# Patient Record
Sex: FEMALE | Race: OTHER | HISPANIC OR LATINO
[De-identification: names, ages, dates, MRNs, and addresses within clinical notes are randomized per-mention and may not be internally consistent; named-entity substitution may affect disease eponyms.]

---

## 2017-01-12 ENCOUNTER — APPOINTMENT (OUTPATIENT)
Dept: PULMONOLOGY | Facility: CLINIC | Age: 62
End: 2017-01-12

## 2017-01-12 VITALS
OXYGEN SATURATION: 94 % | DIASTOLIC BLOOD PRESSURE: 68 MMHG | BODY MASS INDEX: 27.27 KG/M2 | HEART RATE: 86 BPM | WEIGHT: 151.5 LBS | SYSTOLIC BLOOD PRESSURE: 110 MMHG

## 2017-01-13 ENCOUNTER — APPOINTMENT (OUTPATIENT)
Dept: PULMONOLOGY | Facility: CLINIC | Age: 62
End: 2017-01-13

## 2017-03-01 ENCOUNTER — EMERGENCY (EMERGENCY)
Facility: HOSPITAL | Age: 62
LOS: 1 days | Discharge: HOME | End: 2017-03-01
Attending: EMERGENCY MEDICINE
Payer: COMMERCIAL

## 2017-03-01 VITALS
DIASTOLIC BLOOD PRESSURE: 87 MMHG | WEIGHT: 149.91 LBS | RESPIRATION RATE: 20 BRPM | OXYGEN SATURATION: 93 % | HEART RATE: 102 BPM | HEIGHT: 61 IN | TEMPERATURE: 98 F | SYSTOLIC BLOOD PRESSURE: 142 MMHG

## 2017-03-01 DIAGNOSIS — J44.9 CHRONIC OBSTRUCTIVE PULMONARY DISEASE, UNSPECIFIED: ICD-10-CM

## 2017-03-01 DIAGNOSIS — I10 ESSENTIAL (PRIMARY) HYPERTENSION: ICD-10-CM

## 2017-03-01 DIAGNOSIS — E11.9 TYPE 2 DIABETES MELLITUS WITHOUT COMPLICATIONS: ICD-10-CM

## 2017-03-01 DIAGNOSIS — F17.210 NICOTINE DEPENDENCE, CIGARETTES, UNCOMPLICATED: ICD-10-CM

## 2017-03-01 DIAGNOSIS — Z88.0 ALLERGY STATUS TO PENICILLIN: ICD-10-CM

## 2017-03-01 DIAGNOSIS — R06.02 SHORTNESS OF BREATH: ICD-10-CM

## 2017-03-01 PROCEDURE — 99284 EMERGENCY DEPT VISIT MOD MDM: CPT

## 2017-03-01 PROCEDURE — 71020: CPT | Mod: 26

## 2017-03-01 RX ORDER — ALBUTEROL 90 UG/1
2.5 AEROSOL, METERED ORAL ONCE
Qty: 0 | Refills: 0 | Status: COMPLETED | OUTPATIENT
Start: 2017-03-01 | End: 2017-03-01

## 2017-03-01 RX ORDER — IPRATROPIUM/ALBUTEROL SULFATE 18-103MCG
3 AEROSOL WITH ADAPTER (GRAM) INHALATION ONCE
Qty: 0 | Refills: 0 | Status: COMPLETED | OUTPATIENT
Start: 2017-03-01 | End: 2017-03-01

## 2017-03-01 RX ADMIN — Medication 3 MILLILITER(S): at 23:36

## 2017-03-01 RX ADMIN — ALBUTEROL 2.5 MILLIGRAM(S): 90 AEROSOL, METERED ORAL at 23:36

## 2017-03-01 NOTE — ED PROVIDER NOTE - OBJECTIVE STATEMENT
60 y/o F pt w/ PMHx of COPD presents to the ED c/o SOB, cough, and subjective fever x2 days. Pt also notes diaphoresis today. Pt states that she has been using her nebulizer to no relief. Pt is not on home O2, but states that oxygen helps her sx. Pt denies chest pain, edema, nasal congestion, or any other complaints. Pt is allergic to Amoxicillin. PMD: Dr. Lopez.

## 2017-03-01 NOTE — ED PROVIDER NOTE - NS ED MD SCRIBE ATTENDING SCRIBE SECTIONS
PHYSICAL EXAM/DISPOSITION/VITAL SIGNS( Pullset)/REVIEW OF SYSTEMS/HIV/HISTORY OF PRESENT ILLNESS/PAST MEDICAL/SURGICAL/SOCIAL HISTORY

## 2017-03-01 NOTE — ED ADULT NURSE NOTE - OBJECTIVE STATEMENT
Pt. presents to ED with c/o SOB for the past two days and HERNANDEZ. Pt. is a current everyday smoker, with 20 plus pack years. Pt. is labored breathing at rest, placed on breathing treatment. Pt. states feeling better but wheezes are still present bilaterally. Line and labs and solu-medrol. continue to monitor

## 2017-03-01 NOTE — ED PROVIDER NOTE - PMH
COPD (chronic obstructive pulmonary disease)    DM (diabetes mellitus)    HTN (hypertension)    IBS (irritable bowel syndrome)

## 2017-03-01 NOTE — ED ADULT NURSE NOTE - DISCHARGE TEACHING
Patient verbalized understanding of discharge instructions, need for followup with PMD. Patient also provided with signs and symptoms to return to the emergency department immediately.  Patient A+Ox3, resps even and nonlabored, patient in no apparent distress.

## 2017-03-02 VITALS
TEMPERATURE: 97 F | DIASTOLIC BLOOD PRESSURE: 91 MMHG | SYSTOLIC BLOOD PRESSURE: 117 MMHG | RESPIRATION RATE: 20 BRPM | OXYGEN SATURATION: 95 % | HEART RATE: 99 BPM

## 2017-03-02 LAB
ALBUMIN SERPL ELPH-MCNC: 4.5 G/DL — SIGNIFICANT CHANGE UP (ref 3.3–5.2)
ALP SERPL-CCNC: 72 U/L — SIGNIFICANT CHANGE UP (ref 40–120)
ALT FLD-CCNC: 27 U/L — SIGNIFICANT CHANGE UP
ANION GAP SERPL CALC-SCNC: 12 MMOL/L — SIGNIFICANT CHANGE UP (ref 5–17)
AST SERPL-CCNC: 22 U/L — SIGNIFICANT CHANGE UP
BILIRUB SERPL-MCNC: 0.2 MG/DL — LOW (ref 0.4–2)
BUN SERPL-MCNC: 10 MG/DL — SIGNIFICANT CHANGE UP (ref 8–20)
CALCIUM SERPL-MCNC: 9.9 MG/DL — SIGNIFICANT CHANGE UP (ref 8.6–10.2)
CHLORIDE SERPL-SCNC: 101 MMOL/L — SIGNIFICANT CHANGE UP (ref 98–107)
CO2 SERPL-SCNC: 29 MMOL/L — SIGNIFICANT CHANGE UP (ref 22–29)
CREAT SERPL-MCNC: 0.77 MG/DL — SIGNIFICANT CHANGE UP (ref 0.5–1.3)
GLUCOSE SERPL-MCNC: 132 MG/DL — HIGH (ref 70–115)
HCT VFR BLD CALC: 42.9 % — SIGNIFICANT CHANGE UP (ref 37–47)
HGB BLD-MCNC: 14.2 G/DL — SIGNIFICANT CHANGE UP (ref 12–16)
MCHC RBC-ENTMCNC: 31.1 PG — HIGH (ref 27–31)
MCHC RBC-ENTMCNC: 33.1 G/DL — SIGNIFICANT CHANGE UP (ref 32–36)
MCV RBC AUTO: 93.9 FL — SIGNIFICANT CHANGE UP (ref 81–99)
PLATELET # BLD AUTO: 301 K/UL — SIGNIFICANT CHANGE UP (ref 150–400)
POTASSIUM SERPL-MCNC: 4.7 MMOL/L — SIGNIFICANT CHANGE UP (ref 3.5–5.3)
POTASSIUM SERPL-SCNC: 4.7 MMOL/L — SIGNIFICANT CHANGE UP (ref 3.5–5.3)
PROT SERPL-MCNC: 7.7 G/DL — SIGNIFICANT CHANGE UP (ref 6.6–8.7)
RBC # BLD: 4.57 M/UL — SIGNIFICANT CHANGE UP (ref 4.4–5.2)
RBC # FLD: 14.8 % — SIGNIFICANT CHANGE UP (ref 11–15.6)
SODIUM SERPL-SCNC: 142 MMOL/L — SIGNIFICANT CHANGE UP (ref 135–145)
WBC # BLD: 14 K/UL — HIGH (ref 4.8–10.8)
WBC # FLD AUTO: 14 K/UL — HIGH (ref 4.8–10.8)

## 2017-03-02 PROCEDURE — 96374 THER/PROPH/DIAG INJ IV PUSH: CPT

## 2017-03-02 PROCEDURE — 71046 X-RAY EXAM CHEST 2 VIEWS: CPT

## 2017-03-02 PROCEDURE — 94640 AIRWAY INHALATION TREATMENT: CPT

## 2017-03-02 PROCEDURE — 85027 COMPLETE CBC AUTOMATED: CPT

## 2017-03-02 PROCEDURE — 80053 COMPREHEN METABOLIC PANEL: CPT

## 2017-03-02 PROCEDURE — 99284 EMERGENCY DEPT VISIT MOD MDM: CPT | Mod: 25

## 2017-03-02 RX ORDER — IPRATROPIUM/ALBUTEROL SULFATE 18-103MCG
3 AEROSOL WITH ADAPTER (GRAM) INHALATION ONCE
Qty: 0 | Refills: 0 | Status: DISCONTINUED | OUTPATIENT
Start: 2017-03-02 | End: 2017-03-04

## 2017-03-02 RX ORDER — ALBUTEROL 90 UG/1
2.5 AEROSOL, METERED ORAL ONCE
Qty: 0 | Refills: 0 | Status: DISCONTINUED | OUTPATIENT
Start: 2017-03-02 | End: 2017-03-05

## 2017-03-02 RX ADMIN — Medication 20 MILLIGRAM(S): at 02:19

## 2017-03-02 RX ADMIN — Medication 125 MILLIGRAM(S): at 00:01

## 2017-04-13 ENCOUNTER — APPOINTMENT (OUTPATIENT)
Dept: PULMONOLOGY | Facility: CLINIC | Age: 62
End: 2017-04-13

## 2017-04-13 VITALS
SYSTOLIC BLOOD PRESSURE: 110 MMHG | OXYGEN SATURATION: 97 % | HEART RATE: 67 BPM | DIASTOLIC BLOOD PRESSURE: 66 MMHG | WEIGHT: 153 LBS | BODY MASS INDEX: 27.54 KG/M2

## 2017-04-13 RX ORDER — PREDNISONE 10 MG/1
10 TABLET ORAL DAILY
Qty: 100 | Refills: 0 | Status: DISCONTINUED | COMMUNITY
Start: 2017-01-12 | End: 2017-04-13

## 2017-04-14 ENCOUNTER — MEDICATION RENEWAL (OUTPATIENT)
Age: 62
End: 2017-04-14

## 2017-05-08 ENCOUNTER — TRANSCRIPTION ENCOUNTER (OUTPATIENT)
Age: 62
End: 2017-05-08

## 2017-07-19 ENCOUNTER — APPOINTMENT (OUTPATIENT)
Dept: PULMONOLOGY | Facility: CLINIC | Age: 62
End: 2017-07-19

## 2017-07-19 VITALS
HEIGHT: 62.5 IN | WEIGHT: 154 LBS | RESPIRATION RATE: 16 BRPM | OXYGEN SATURATION: 91 % | SYSTOLIC BLOOD PRESSURE: 128 MMHG | BODY MASS INDEX: 27.63 KG/M2 | HEART RATE: 96 BPM | DIASTOLIC BLOOD PRESSURE: 80 MMHG

## 2017-07-19 RX ORDER — AZITHROMYCIN 250 MG/1
250 TABLET, FILM COATED ORAL
Qty: 1 | Refills: 0 | Status: DISCONTINUED | COMMUNITY
Start: 2017-04-13 | End: 2017-07-19

## 2017-08-17 ENCOUNTER — APPOINTMENT (OUTPATIENT)
Dept: PULMONOLOGY | Facility: CLINIC | Age: 62
End: 2017-08-17

## 2018-01-26 ENCOUNTER — TRANSCRIPTION ENCOUNTER (OUTPATIENT)
Age: 63
End: 2018-01-26

## 2018-03-20 ENCOUNTER — TRANSCRIPTION ENCOUNTER (OUTPATIENT)
Age: 63
End: 2018-03-20

## 2018-04-26 ENCOUNTER — APPOINTMENT (OUTPATIENT)
Dept: PULMONOLOGY | Facility: CLINIC | Age: 63
End: 2018-04-26
Payer: MEDICARE

## 2018-04-26 VITALS
DIASTOLIC BLOOD PRESSURE: 70 MMHG | WEIGHT: 148 LBS | SYSTOLIC BLOOD PRESSURE: 130 MMHG | BODY MASS INDEX: 26.55 KG/M2 | HEART RATE: 105 BPM | OXYGEN SATURATION: 91 % | HEIGHT: 62.5 IN

## 2018-04-26 PROCEDURE — 99406 BEHAV CHNG SMOKING 3-10 MIN: CPT

## 2018-04-26 PROCEDURE — 99214 OFFICE O/P EST MOD 30 MIN: CPT | Mod: 25

## 2018-04-26 RX ORDER — TRIAMCINOLONE ACETONIDE 55 UG/1
55 SPRAY, METERED NASAL
Refills: 0 | Status: DISCONTINUED | COMMUNITY
Start: 2017-04-13 | End: 2018-04-26

## 2018-04-26 RX ORDER — PREDNISONE 10 MG/1
10 TABLET ORAL DAILY
Qty: 100 | Refills: 0 | Status: DISCONTINUED | COMMUNITY
Start: 2017-07-19 | End: 2018-04-26

## 2018-04-26 RX ORDER — AMOXICILLIN 875 MG/1
875 TABLET, FILM COATED ORAL
Qty: 20 | Refills: 0 | Status: DISCONTINUED | COMMUNITY
Start: 2018-01-26 | End: 2018-04-26

## 2018-04-26 RX ORDER — METHYLPREDNISOLONE 4 MG/1
4 TABLET ORAL
Qty: 21 | Refills: 0 | Status: DISCONTINUED | COMMUNITY
Start: 2017-11-25 | End: 2018-04-26

## 2018-04-26 RX ORDER — AZITHROMYCIN 250 MG/1
250 TABLET, FILM COATED ORAL
Qty: 1 | Refills: 0 | Status: DISCONTINUED | COMMUNITY
Start: 2017-07-19 | End: 2018-04-26

## 2018-06-14 ENCOUNTER — APPOINTMENT (OUTPATIENT)
Dept: PULMONOLOGY | Facility: CLINIC | Age: 63
End: 2018-06-14
Payer: MEDICARE

## 2018-06-14 VITALS — DIASTOLIC BLOOD PRESSURE: 62 MMHG | SYSTOLIC BLOOD PRESSURE: 110 MMHG

## 2018-06-14 VITALS — HEART RATE: 91 BPM | OXYGEN SATURATION: 95 %

## 2018-06-14 VITALS — WEIGHT: 150 LBS | BODY MASS INDEX: 27 KG/M2

## 2018-06-14 PROCEDURE — 85018 HEMOGLOBIN: CPT | Mod: QW

## 2018-06-14 PROCEDURE — 99214 OFFICE O/P EST MOD 30 MIN: CPT | Mod: 25

## 2018-06-14 PROCEDURE — 94060 EVALUATION OF WHEEZING: CPT

## 2018-06-14 PROCEDURE — 99406 BEHAV CHNG SMOKING 3-10 MIN: CPT

## 2018-06-14 PROCEDURE — 94664 DEMO&/EVAL PT USE INHALER: CPT | Mod: 59

## 2018-06-14 PROCEDURE — 94727 GAS DIL/WSHOT DETER LNG VOL: CPT

## 2018-06-14 PROCEDURE — 94729 DIFFUSING CAPACITY: CPT

## 2018-06-14 RX ORDER — AZITHROMYCIN 250 MG/1
250 TABLET, FILM COATED ORAL
Qty: 1 | Refills: 0 | Status: DISCONTINUED | COMMUNITY
Start: 2018-04-26 | End: 2018-06-14

## 2018-06-14 RX ORDER — PREDNISONE 10 MG/1
10 TABLET ORAL DAILY
Qty: 100 | Refills: 0 | Status: DISCONTINUED | COMMUNITY
Start: 2018-04-26 | End: 2018-06-14

## 2018-08-30 PROBLEM — J44.9 CHRONIC OBSTRUCTIVE PULMONARY DISEASE, UNSPECIFIED: Chronic | Status: ACTIVE | Noted: 2017-03-02

## 2018-09-05 ENCOUNTER — TRANSCRIPTION ENCOUNTER (OUTPATIENT)
Age: 63
End: 2018-09-05

## 2018-10-02 ENCOUNTER — APPOINTMENT (OUTPATIENT)
Dept: PULMONOLOGY | Facility: CLINIC | Age: 63
End: 2018-10-02
Payer: MEDICARE

## 2018-10-02 VITALS
HEART RATE: 91 BPM | OXYGEN SATURATION: 91 % | WEIGHT: 144 LBS | BODY MASS INDEX: 25.92 KG/M2 | SYSTOLIC BLOOD PRESSURE: 118 MMHG | DIASTOLIC BLOOD PRESSURE: 68 MMHG

## 2018-10-02 PROCEDURE — 99406 BEHAV CHNG SMOKING 3-10 MIN: CPT

## 2018-10-02 PROCEDURE — 99215 OFFICE O/P EST HI 40 MIN: CPT | Mod: 25

## 2018-10-02 PROCEDURE — 94010 BREATHING CAPACITY TEST: CPT

## 2018-10-02 RX ORDER — MESALAMINE 375 MG/1
0.38 CAPSULE, EXTENDED RELEASE ORAL
Qty: 120 | Refills: 0 | Status: DISCONTINUED | COMMUNITY
Start: 2017-07-25 | End: 2018-10-02

## 2018-10-02 RX ORDER — MONTELUKAST 10 MG/1
10 TABLET, FILM COATED ORAL
Qty: 30 | Refills: 0 | Status: DISCONTINUED | COMMUNITY
Start: 2018-02-03 | End: 2018-10-02

## 2018-10-26 ENCOUNTER — INBOUND DOCUMENT (OUTPATIENT)
Age: 63
End: 2018-10-26

## 2018-11-27 ENCOUNTER — APPOINTMENT (OUTPATIENT)
Dept: PULMONOLOGY | Facility: CLINIC | Age: 63
End: 2018-11-27

## 2019-01-12 ENCOUNTER — TRANSCRIPTION ENCOUNTER (OUTPATIENT)
Age: 64
End: 2019-01-12

## 2019-05-09 ENCOUNTER — TRANSCRIPTION ENCOUNTER (OUTPATIENT)
Age: 64
End: 2019-05-09

## 2020-08-26 NOTE — ED PROVIDER NOTE - NS ED MD DISPO DISCHARGE CCDA
A virtual family conference was held over the phone with patient's boyfriend Donna Jaimes provided status update on patient's improvement including current meds  Pt also shared her learning coping skills at the hospital and her plans to use the same  [place] expressed his concerns about patient's manic behaviors which lead to patient being tearful  But patient demonstrated self-control and composed herself  Boyfriend is willing to support patient in her recovery at the same time do not want to jeopardize his kids  Boyfriend suggested that patient must split her time between boyfriend's and mother's house to see her consistency in managing symptoms  To which, pt was not happy about this plan but she agreed saying this will also give her more time to work on herself  Eventually, with a plan to transitioning back with boyfriend  Pt denied SI, HI and AVH       [place] [facility] will come to pick patient up tomorrow at 12:00 PM Patient/Caregiver provided printed discharge information.

## 2020-11-09 ENCOUNTER — APPOINTMENT (OUTPATIENT)
Dept: PULMONOLOGY | Facility: CLINIC | Age: 65
End: 2020-11-09
Payer: MEDICARE

## 2020-11-09 ENCOUNTER — APPOINTMENT (OUTPATIENT)
Dept: PULMONOLOGY | Facility: CLINIC | Age: 65
End: 2020-11-09

## 2020-11-09 VITALS
OXYGEN SATURATION: 93 % | WEIGHT: 138 LBS | SYSTOLIC BLOOD PRESSURE: 120 MMHG | DIASTOLIC BLOOD PRESSURE: 70 MMHG | HEIGHT: 62.5 IN | BODY MASS INDEX: 24.76 KG/M2 | HEART RATE: 92 BPM

## 2020-11-09 PROCEDURE — 99214 OFFICE O/P EST MOD 30 MIN: CPT | Mod: 25

## 2020-11-09 PROCEDURE — G0296 VISIT TO DETERM LDCT ELIG: CPT

## 2020-11-09 PROCEDURE — 99406 BEHAV CHNG SMOKING 3-10 MIN: CPT

## 2020-11-09 RX ORDER — FLUTICASONE FUROATE, UMECLIDINIUM BROMIDE AND VILANTEROL TRIFENATATE 100; 62.5; 25 UG/1; UG/1; UG/1
100-62.5-25 POWDER RESPIRATORY (INHALATION)
Refills: 0 | Status: DISCONTINUED | COMMUNITY
End: 2020-11-09

## 2020-11-09 NOTE — COUNSELING
[Risk of tobacco use and health benefits of smoking cessation discussed] : Risk of tobacco use and health benefits of smoking cessation discussed [Cessation strategies including cessation program discussed] : Cessation strategies including cessation program discussed [Use of nicotine replacement therapies and other medications discussed] : Use of nicotine replacement therapies and other medications discussed [Encouraged to pick a quit date and identify support needed to quit] : Encouraged to pick a quit date and identify support needed to quit [Tobacco Use Cessation Intermediate Greater Than 3 Minutes Up to 10 Minutes] : Tobacco Use Cessation Intermediate Greater Than 3 Minutes Up to 10 Minutes [ - Annual Lung Cancer Screening/Share Decision Making Discussion] : Annual Lung Cancer Screening/Share Decision Making Discussion. (I have advised this patient to have a Low Dose CT (LDCT) scan of the lungs and have discussed the following with the patient in a shared decision making discussion:   Benefits of Detection and Early Treatment: There is adequate evidence that annual screening for lung cancer with LDCT in a population of high-risk persons can prevent a substantial number of lung cancer–related deaths. The magnitude of benefit depends on the individual patient's risk for lung cancer, as those who are at highest risk are most likely to benefit. Screening cannot prevent most lung cancer–related deaths, and does not replace smoking cessation. Harms of Detection and Early Intervention and Treatment: The harms associated with LDCT screening include false-negative and false-positive results, incidental findings, over diagnosis, and radiation exposure. False-positive LDCT results occur in a substantial proportion of screened persons; 95% of all positive results do not lead to a diagnosis of cancer. In a high-quality screening program, further imaging can resolve most false-positive results; however, some patients may require invasive procedures. Radiation harms, including cancer resulting from cumulative exposure to radiation, vary depending on the age at the start of screening; the number of scans received; and the person's exposure to other sources of radiation, particularly other medical imaging.) [Willing to Quit Smoking] : Not willing to quit smoking

## 2020-11-09 NOTE — CONSULT LETTER
[Dear  ___] : Dear  [unfilled], [Consult Letter:] : I had the pleasure of evaluating your patient, [unfilled]. [Please see my note below.] : Please see my note below. [Consult Closing:] : Thank you very much for allowing me to participate in the care of this patient.  If you have any questions, please do not hesitate to contact me. [Sincerely,] : Sincerely, [Curtis Hardy MD] : Curtis Hardy MD [FreeTextEntry3] : Curtis Hardy MD, FCCP, D. ABSM\par Pulmonary and Sleep Medicine\par [facility] at [place]

## 2020-11-09 NOTE — PROCEDURE
[FreeTextEntry1] : PFTs performed previously revealed moderate to severe COPD and was near baseline. Lung volumes were normal but diffusion capacity was moderately reduced though nearly corrected for alveolar volume.\par PFTs subsequently were essentially unchanged\par Spirometry 10/1/18 - severe COPD and worse than her previous

## 2020-11-09 NOTE — REVIEW OF SYSTEMS
[Postnasal Drip] : postnasal drip [Cough] : cough [Hay Fever] : hay fever [Diarrhea] : diarrhea [Depression] : depression [Diabetes] : diabetes mellitus [Fever] : no fever [Chills] : no chills [Dry Eyes] : no dryness of the eyes [Eye Irritation] : no ~T irritation of the eyes [Nasal Congestion] : no nasal congestion [Epistaxis] : no nosebleeds [Sinus Problems] : no sinus problems [Hypertension] : no ~T hypertension [Chest Discomfort] : no chest discomfort [Dysrhythmia] : no dysrhythmia [Murmurs] : no murmurs were heard [Palpitations] : no palpitations [Edema] : ~T edema was not present [Itchy Eyes] : no itching of ~T the eyes [Reflux] : no reflux [Nausea] : no nausea [Vomiting] : no vomiting [Constipation] : no constipation [Abdominal Pain] : no abdominal pain [Dysuria] : no dysuria [Trauma] : no ~T physical trauma [Fracture] : no fracture [Anemia] : no anemia [Headache] : no headache [Dizziness] : no dizziness [Syncope] : no fainting [Numbness] : no numbness [Paralysis] : no paralysis was seen [Seizures] : no seizures [Anxiety] : no anxiety [Thyroid Problem] : no thyroid problem [Difficulty Initiating Sleep] : no difficulty falling asleep [Difficulty Maintaining Sleep] : no difficulty maintaining sleep [Snoring] : no snoring [Witnessed Apneas] : demonstrated no ~M apnea [Nonrestorative Sleep] : restorative sleep

## 2020-11-09 NOTE — DISCUSSION/SUMMARY
[FreeTextEntry1] : \par #1. Pt on Trelegy 100 for her moderate COPD instead of Symbicort and Spiriva.\par #2. Chest CT for lung cancer screening given smoking hx. Risks and benefits regarding screening CT discussed including but not limited to the benefit of early lung cancer detection as well as other possible unknown pathology but also risk of discovering nodules or other findings which may be benign but will require periodic evaluation.\par #3. Stressed importance of smoking cessation; provided information on the [facility] program for smoking cessation in the past\par #4. F/u in 4-6 PFTs and lung cancer screening CT\par #5. Encouraged exercise \par #6. Nasonex for PNDS\par #7. Consider ENT evaluation\par #8. Z-skip and prednisone taper for cough, congestion, and wheeze when needed\par #9. Encouraged compliance with f/u\par #10. Reviewed risks of exposure and symptoms of Covid-19 virus, including how the virus is spread.\par \par Discussed the following risk-reducing strategies:\par -Wash hands with soap and water (proper technique reviewed) \par -Use alcohol based  when hand-washing is not an option\par -Maintain a social distance of at least 6 feet whenever possible\par -Avoid contact, especially hand shaking\par -Avoid touching eyes, nose, and mouth\par -Cover face/mouth when coughing or sneezing\par -Avoid close contact with sick people\par -Seek medical help if you develop a fever and/or respiratory symptoms (e.g. cough, chest pain, SOB)\par \par Patient's questions were answered and patient appears to understand these recommendations\par

## 2020-11-09 NOTE — HISTORY OF PRESENT ILLNESS
[Doing Well] : doing well [Difficulty Breathing During Exertion] : worsened dyspnea on exertion [Feelings Of Weakness On Exertion] : worsened exercise intolerance [Cough] : worsened coughing [Coughing Up Sputum] : worsened coughing up sputum [Wheezing] : worsened wheezing [Regional Soft Tissue Swelling Both Lower Extremities] : denies lower extremity edema [URI] : upper respiratory tract infection [Adherent] : the patient is adherent with ~his/her~ medication regimen [Goals--Doing Well] : the patient is doing well with ~his/her~ COPD goals [Follow-Up - Routine Clinic] : clinic follow-up for [Low Interest] : low interest in quitting [Several Times] : tried to quit several times [Weight Control] : weight control [Stress Management] : stress management [Saving Money] : saving money [Improved Health] : improved health [Smoking Addiction] : smoking addiction [Nicotine Craving] : nicotine craving [Anxiety] : anxiety [Currently Experiencing] : The patient is currently experiencing symptoms. [CT Scan] : a CT scan [On ___] : performed on [unfilled] [Patient] : the patient [To Assess ___] : to assess [unfilled] [FreeTextEntry1] :  The patient was previously followed by our service during her hospitalization at [facility] when she was admitted for a COPD exacerbation.\par The patient's daughter passed away from cancer in 2013 and then her  passed in 7/2015. She had been somewhat tearful previously but appears to be doing better now. \par Pt was last seen 10/2018. [FreeTextEntry5] : chest CT [FreeTextEntry8] : stable nodules and emphysematous changes

## 2020-11-09 NOTE — REASON FOR VISIT
[Follow-Up] : a follow-up visit [Abnormal CT Scan] : abnormal CT Scan [COPD] : COPD [Cough] : cough [Shortness of Breath] : shortness of Breath [FreeTextEntry2] : pulm nodules, nicotine dependence

## 2020-11-09 NOTE — PHYSICAL EXAM
[Normal Appearance] : normal appearance [Normal Conjunctiva] : the conjunctiva exhibited no abnormalities [Low Lying Soft Palate] : low lying soft palate [Enlarged Base of the Tongue] : enlargement of the base of the tongue [III] : III [Neck Appearance] : the appearance of the neck was normal [Heart Rate And Rhythm] : heart rate and rhythm were normal [Heart Sounds] : normal S1 and S2 [Murmurs] : no murmurs present [Edema] : no peripheral edema present [] : no respiratory distress [Respiration, Rhythm And Depth] : normal respiratory rhythm and effort [Exaggerated Use Of Accessory Muscles For Inspiration] : no accessory muscle use [Auscultation Breath Sounds / Voice Sounds] : lungs were clear to auscultation bilaterally [Abdomen Soft] : soft [Abdomen Tenderness] : non-tender [Abnormal Walk] : normal gait [Nail Clubbing] : no clubbing of the fingernails [Cyanosis, Localized] : no localized cyanosis [Oriented To Time, Place, And Person] : oriented to person, place, and time [FreeTextEntry1] : No abnormalities

## 2020-12-15 DIAGNOSIS — Z01.818 ENCOUNTER FOR OTHER PREPROCEDURAL EXAMINATION: ICD-10-CM

## 2020-12-18 ENCOUNTER — APPOINTMENT (OUTPATIENT)
Dept: DISASTER EMERGENCY | Facility: CLINIC | Age: 65
End: 2020-12-18

## 2020-12-21 ENCOUNTER — APPOINTMENT (OUTPATIENT)
Dept: PULMONOLOGY | Facility: CLINIC | Age: 65
End: 2020-12-21

## 2021-07-27 ENCOUNTER — NON-APPOINTMENT (OUTPATIENT)
Age: 66
End: 2021-07-27

## 2021-08-13 ENCOUNTER — APPOINTMENT (OUTPATIENT)
Dept: PULMONOLOGY | Facility: CLINIC | Age: 66
End: 2021-08-13
Payer: MEDICARE

## 2021-08-13 VITALS — DIASTOLIC BLOOD PRESSURE: 71 MMHG | SYSTOLIC BLOOD PRESSURE: 120 MMHG | HEART RATE: 101 BPM | OXYGEN SATURATION: 91 %

## 2021-08-13 PROCEDURE — 94729 DIFFUSING CAPACITY: CPT

## 2021-08-13 PROCEDURE — 94727 GAS DIL/WSHOT DETER LNG VOL: CPT

## 2021-08-13 PROCEDURE — G0296 VISIT TO DETERM LDCT ELIG: CPT

## 2021-08-13 PROCEDURE — 85018 HEMOGLOBIN: CPT | Mod: QW

## 2021-08-13 PROCEDURE — 99406 BEHAV CHNG SMOKING 3-10 MIN: CPT

## 2021-08-13 PROCEDURE — 94010 BREATHING CAPACITY TEST: CPT

## 2021-08-13 PROCEDURE — 99215 OFFICE O/P EST HI 40 MIN: CPT | Mod: 25

## 2021-08-13 RX ORDER — AZITHROMYCIN 250 MG/1
250 TABLET, FILM COATED ORAL
Qty: 1 | Refills: 0 | Status: DISCONTINUED | COMMUNITY
Start: 2018-10-02 | End: 2021-08-13

## 2021-08-13 NOTE — COUNSELING
[Risk of tobacco use and health benefits of smoking cessation discussed] : Risk of tobacco use and health benefits of smoking cessation discussed [Cessation strategies including cessation program discussed] : Cessation strategies including cessation program discussed [Use of nicotine replacement therapies and other medications discussed] : Use of nicotine replacement therapies and other medications discussed [Encouraged to pick a quit date and identify support needed to quit] : Encouraged to pick a quit date and identify support needed to quit [Tobacco Use Cessation Intermediate Greater Than 3 Minutes Up to 10 Minutes] : Tobacco Use Cessation Intermediate Greater Than 3 Minutes Up to 10 Minutes [ - Annual Lung Cancer Screening/Share Decision Making Discussion] : Annual Lung Cancer Screening/Share Decision Making Discussion. (I have advised this patient to have a Low Dose CT (LDCT) scan of the lungs and have discussed the following with the patient in a shared decision making discussion:   Benefits of Detection and Early Treatment: There is adequate evidence that annual screening for lung cancer with LDCT in a population of high-risk persons can prevent a substantial number of lung cancer–related deaths. The magnitude of benefit depends on the individual patient's risk for lung cancer, as those who are at highest risk are most likely to benefit. Screening cannot prevent most lung cancer–related deaths, and does not replace smoking cessation. Harms of Detection and Early Intervention and Treatment: The harms associated with LDCT screening include false-negative and false-positive results, incidental findings, over diagnosis, and radiation exposure. False-positive LDCT results occur in a substantial proportion of screened persons; 95% of all positive results do not lead to a diagnosis of cancer. In a high-quality screening program, further imaging can resolve most false-positive results; however, some patients may require invasive procedures. Radiation harms, including cancer resulting from cumulative exposure to radiation, vary depending on the age at the start of screening; the number of scans received; and the person's exposure to other sources of radiation, particularly other medical imaging.) [Willing to Quit Smoking] : Not willing to quit smoking [FreeTextEntry1] : 4

## 2021-08-13 NOTE — CONSULT LETTER
[Dear  ___] : Dear  [unfilled], [Consult Letter:] : I had the pleasure of evaluating your patient, [unfilled]. [Please see my note below.] : Please see my note below. [Consult Closing:] : Thank you very much for allowing me to participate in the care of this patient.  If you have any questions, please do not hesitate to contact me. [Sincerely,] : Sincerely, [FreeTextEntry3] : Curtis Hardy MD, FCCP, D. ABSM\par Pulmonary and Sleep Medicine\par [facility] at [place]

## 2021-08-13 NOTE — HISTORY OF PRESENT ILLNESS
[Doing Well] : doing well [Difficulty Breathing During Exertion] : worsened dyspnea on exertion [Feelings Of Weakness On Exertion] : worsened exercise intolerance [Cough] : worsened coughing [Coughing Up Sputum] : worsened coughing up sputum [Wheezing] : worsened wheezing [Regional Soft Tissue Swelling Both Lower Extremities] : denies lower extremity edema [URI] : upper respiratory tract infection [Adherent] : the patient is adherent with ~his/her~ medication regimen [Goals--Doing Well] : the patient is doing well with ~his/her~ COPD goals [Low Interest] : low interest in quitting [Several Times] : tried to quit several times [Weight Control] : weight control [Stress Management] : stress management [Saving Money] : saving money [Improved Health] : improved health [Smoking Addiction] : smoking addiction [Nicotine Craving] : nicotine craving [Anxiety] : anxiety [Currently Experiencing] : The patient is currently experiencing symptoms. [CT Scan] : a CT scan [On ___] : performed on [unfilled] [Patient] : the patient [To Assess ___] : to assess [unfilled] [Follow-Up - Routine Clinic] : a routine clinic follow-up of [None] : No associated symptoms are reported [Good Tolerance] : good tolerance of treatment [Good Symptom Control] : good symptom control [FreeTextEntry1] :  The patient was previously followed by our service during her hospitalization at [facility] when she was admitted for a COPD exacerbation.\par The patient's daughter passed away from cancer in 2013 and then her  passed in 7/2015. She had been somewhat tearful previously but appears to be doing better now. \par Pt was last seen 10/2018. [FreeTextEntry5] : chest CT [FreeTextEntry8] : stable nodules and emphysematous changes [Poor Compliance] : poor compliance with treatment [de-identified] : APAP

## 2021-08-13 NOTE — DISCUSSION/SUMMARY
[FreeTextEntry1] : \par #1. Pt on Trelegy 200 for her moderate COPD instead of Symbicort and Spiriva.\par #2. Chest CT for lung cancer screening given smoking hx. Risks and benefits regarding screening CT discussed including but not limited to the benefit of early lung cancer detection as well as other possible unknown pathology but also risk of discovering nodules or other findings which may be benign but will require periodic evaluation. Will repeat CT given two new nodules of 4 and 6 mm in size.\par #3. Stressed importance of smoking cessation; provided information on the [facility] program for smoking cessation in the past\par #4. F/u in 6 with chest CT\par #5. Encouraged exercise \par #6. Nasonex for PNDS\par #7. Consider ENT evaluation\par #8. Z-skip and prednisone taper for cough, congestion, and wheeze when needed\par #9. Encouraged compliance with f/u\par #10. Pt had both Covid vaccines\par #11. Encouraged compliance with APAP for mild CHINO with an AHI of 7.7\par #12. Reviewed risks of exposure and symptoms of Covid-19 virus, including how the virus is spread and precautions to avoid silvia virus.\par \par Patient's questions were answered and patient appears to understand these recommendations \par

## 2021-08-13 NOTE — PROCEDURE
[FreeTextEntry1] : PFTs performed previously revealed moderate to severe COPD and was near baseline. Lung volumes were normal but diffusion capacity was moderately reduced though nearly corrected for alveolar volume.\par PFTs subsequently were essentially unchanged\par Spirometry 10/1/18 - severe COPD and worse than her previous\par PFTs 8/13/21 - Moderately reduced FVC with severely reduced FEV1 with an obstructive pattern with near normal lung volumes and mildly reduced diffusion capacity which corrected for alveolar volume.

## 2021-08-13 NOTE — REASON FOR VISIT
[Follow-Up] : a follow-up visit [Abnormal CXR/ Chest CT] : an abnormal CXR/ chest CT [Sleep Apnea] : sleep apnea [COPD] : COPD [Shortness of Breath] : shortness of breath [Pulmonary Nodules] : pulmonary nodules [Nicotine Dependence] : nicotine dependence

## 2021-08-25 ENCOUNTER — APPOINTMENT (OUTPATIENT)
Dept: PULMONOLOGY | Facility: CLINIC | Age: 66
End: 2021-08-25
Payer: MEDICARE

## 2021-08-25 PROCEDURE — 99442: CPT | Mod: 95

## 2021-08-25 RX ORDER — ALBUTEROL SULFATE 90 UG/1
108 (90 BASE) AEROSOL, METERED RESPIRATORY (INHALATION)
Qty: 3 | Refills: 3 | Status: ACTIVE | COMMUNITY
Start: 2017-04-13

## 2021-08-25 RX ORDER — PREDNISONE 10 MG/1
10 TABLET ORAL DAILY
Qty: 100 | Refills: 0 | Status: DISCONTINUED | COMMUNITY
Start: 2018-10-02 | End: 2021-08-25

## 2021-08-25 RX ORDER — FLUTICASONE FUROATE, UMECLIDINIUM BROMIDE AND VILANTEROL TRIFENATATE 200; 62.5; 25 UG/1; UG/1; UG/1
200-62.5-25 POWDER RESPIRATORY (INHALATION) DAILY
Qty: 3 | Refills: 1 | Status: ACTIVE | COMMUNITY
Start: 2020-11-09

## 2021-09-07 ENCOUNTER — APPOINTMENT (OUTPATIENT)
Dept: CARDIOLOGY | Facility: CLINIC | Age: 66
End: 2021-09-07

## 2021-09-29 ENCOUNTER — APPOINTMENT (OUTPATIENT)
Dept: CARDIOLOGY | Facility: CLINIC | Age: 66
End: 2021-09-29
Payer: MEDICARE

## 2021-09-29 ENCOUNTER — NON-APPOINTMENT (OUTPATIENT)
Age: 66
End: 2021-09-29

## 2021-09-29 VITALS
RESPIRATION RATE: 17 BRPM | WEIGHT: 137 LBS | DIASTOLIC BLOOD PRESSURE: 68 MMHG | BODY MASS INDEX: 24.27 KG/M2 | SYSTOLIC BLOOD PRESSURE: 110 MMHG | TEMPERATURE: 98.1 F | HEART RATE: 97 BPM | OXYGEN SATURATION: 92 % | HEIGHT: 63 IN

## 2021-09-29 VITALS — SYSTOLIC BLOOD PRESSURE: 118 MMHG | DIASTOLIC BLOOD PRESSURE: 70 MMHG

## 2021-09-29 DIAGNOSIS — Z13.6 ENCOUNTER FOR SCREENING FOR CARDIOVASCULAR DISORDERS: ICD-10-CM

## 2021-09-29 PROCEDURE — 93000 ELECTROCARDIOGRAM COMPLETE: CPT

## 2021-09-29 PROCEDURE — 99205 OFFICE O/P NEW HI 60 MIN: CPT

## 2021-09-29 RX ORDER — ATORVASTATIN CALCIUM 10 MG/1
10 TABLET, FILM COATED ORAL
Qty: 30 | Refills: 3 | Status: ACTIVE | COMMUNITY
Start: 2021-09-29 | End: 1900-01-01

## 2021-09-29 RX ORDER — METHYLPREDNISOLONE 4 MG/1
4 TABLET ORAL
Qty: 1 | Refills: 1 | Status: DISCONTINUED | COMMUNITY
Start: 2021-08-25 | End: 2021-09-29

## 2021-09-29 NOTE — REASON FOR VISIT
[Symptom and Test Evaluation] : symptom and test evaluation [Hyperlipidemia] : hyperlipidemia [Hypertension] : hypertension [FreeTextEntry1] : dyspnea on exertion and extensive smoking addiction

## 2021-09-29 NOTE — CARDIOLOGY SUMMARY
[de-identified] : 9 29 2021  Sinus  Rhythm \par  Nonspecific T-abnormality. \par \par ABNORMAL \par

## 2021-09-29 NOTE — HISTORY OF PRESENT ILLNESS
[FreeTextEntry1] : dyspnea on exertion \par \par This is a 66 year old woman with history of smoking, obesity, sleep apnea, here with dyspnea on exertion \par  she saw dr. foley ,. and he reviewed her imaging. and he spotted something and recommend to see a cardiologist.\par She has COPD. she gets shortness of breathe . when  she goes upstairs. recently change in dyspnea on exertion . slowly and progressively getting worse. \par  She gained 5 lbs. \par she smoked 1.5 pack a day for 30 years. she still smoking. \par she does complain of pain in the legs and calf pain. both legs. \par \par

## 2021-09-29 NOTE — DISCUSSION/SUMMARY
[Patient] : the patient [Risks] : risks [Benefits] : benefits [Alternatives] : alternatives [___ Month(s)] : in [unfilled] month(s) [With Me] : with me [FreeTextEntry1] : This is a 66 year old woman with Diabetes Mellitus and .extensive smoking history , here with dyspnea on exertion \par \par 1) dyspnea on exertion :  abnormal EKG. stress test.  2D echo.  \par 2) claudications: Rhett and US arterial Duplex. right femoral calcification in SFA and right iliac areas on Ct abdomen. \par 3) AAA screening: > 30 pack year history of smoking. US aorta with IVC. \par 4) calcifications of lower extremity arteries: aspirin and statins. \par \par Will order and review ECG for the above mentioned diagnosis/condition/symptoms

## 2021-10-21 ENCOUNTER — APPOINTMENT (OUTPATIENT)
Dept: CARDIOLOGY | Facility: CLINIC | Age: 66
End: 2021-10-21
Payer: MEDICARE

## 2021-10-21 PROCEDURE — 93306 TTE W/DOPPLER COMPLETE: CPT

## 2021-10-21 PROCEDURE — 93923 UPR/LXTR ART STDY 3+ LVLS: CPT

## 2021-10-22 ENCOUNTER — NON-APPOINTMENT (OUTPATIENT)
Age: 66
End: 2021-10-22

## 2021-10-22 ENCOUNTER — APPOINTMENT (OUTPATIENT)
Dept: CARDIOLOGY | Facility: CLINIC | Age: 66
End: 2021-10-22
Payer: MEDICARE

## 2021-10-22 PROCEDURE — 93979 VASCULAR STUDY: CPT

## 2021-10-22 PROCEDURE — 93925 LOWER EXTREMITY STUDY: CPT

## 2021-10-23 ENCOUNTER — NON-APPOINTMENT (OUTPATIENT)
Age: 66
End: 2021-10-23

## 2021-10-23 ENCOUNTER — TRANSCRIPTION ENCOUNTER (OUTPATIENT)
Age: 66
End: 2021-10-23

## 2021-10-25 ENCOUNTER — APPOINTMENT (OUTPATIENT)
Dept: CARDIOLOGY | Facility: CLINIC | Age: 66
End: 2021-10-25

## 2021-11-01 ENCOUNTER — OUTPATIENT (OUTPATIENT)
Dept: OUTPATIENT SERVICES | Facility: HOSPITAL | Age: 66
LOS: 1 days | End: 2021-11-01
Payer: MEDICARE

## 2021-11-01 ENCOUNTER — TRANSCRIPTION ENCOUNTER (OUTPATIENT)
Age: 66
End: 2021-11-01

## 2021-11-01 ENCOUNTER — APPOINTMENT (OUTPATIENT)
Dept: CT IMAGING | Facility: CLINIC | Age: 66
End: 2021-11-01
Payer: MEDICARE

## 2021-11-01 DIAGNOSIS — I70.8 ATHEROSCLEROSIS OF OTHER ARTERIES: ICD-10-CM

## 2021-11-01 PROCEDURE — G1004: CPT

## 2021-11-01 PROCEDURE — 73706 CT ANGIO LWR EXTR W/O&W/DYE: CPT | Mod: 26,50,ME

## 2021-11-01 PROCEDURE — 73706 CT ANGIO LWR EXTR W/O&W/DYE: CPT | Mod: ME

## 2021-11-01 PROCEDURE — 82565 ASSAY OF CREATININE: CPT

## 2021-11-05 ENCOUNTER — APPOINTMENT (OUTPATIENT)
Dept: DISASTER EMERGENCY | Facility: CLINIC | Age: 66
End: 2021-11-05

## 2021-11-05 RX ORDER — CHLORHEXIDINE GLUCONATE 213 G/1000ML
1 SOLUTION TOPICAL ONCE
Refills: 0 | Status: DISCONTINUED | OUTPATIENT
Start: 2021-11-08 | End: 2021-11-22

## 2021-11-05 NOTE — H&P PST ADULT - ASSESSMENT
Impression:      Plan:  -plan for LHC via RA vs FA  -patient seen and examined  -confirmed appropriate NPO duration  -ECG and Labs reviewed  -Aspirin 81mg po pre-cath  -procedure discussed with patient; risks and benefits explained, questions answered  -consent obtained by attending IC         Impression:      Plan:  -plan for LHC via RA vs LFA  -patient seen and examined  -confirmed appropriate NPO duration  -ECG and Labs reviewed  -Aspirin 81mg po pre-cath  -procedure discussed with patient; risks and benefits explained, questions answered  -consent obtained by attending IC  -Consider peripheral angio +/- PTA at a future date if indicated         Impression:  65 y/o F with C/O SOB and chest pressure found to have WMA on Echo and presents for LHC for further evaluation of her coronary anatomy    Plan:  -plan for LHC via RA vs LFA  -patient seen and examined  -confirmed appropriate NPO duration  -ECG and Labs reviewed  -Aspirin 81mg po pre-cath  -procedure discussed with patient; risks and benefits explained, questions answered  -consent obtained by attending IC  -Consider peripheral angio +/- PTA at a future date if indicated

## 2021-11-05 NOTE — H&P PST ADULT - HISTORY OF PRESENT ILLNESS
Department of Cardiology                                                                  [facility]/[place] [address]                                                            Telephone: [phone]. Fax:[phone]                                                                             Pre- Procedure H + P/Progress Note      HPI:  65yo female with h/o HTN, HLD, obesity, DM, smoker, COPD, CHINO (***), Crohn's disease, GERD,       Symptoms:        Angina (Class):        Ischemic Symptoms:     Heart Failure:        Systolic/Diastolic/Combined:        NYHA Class (within 2 weeks):     Assessment of LVEF:       EF:        Assessed by:        Date:     Prior Cardiac Interventions:         Noninvasive Testing:   Stress Test: Date:        Protocol:        Duration of Exercise:        Symptoms:        EKG Changes:        DTS:        Myocardial Imaging:        Risk Assessment (Low, Medium, High):     Echo:       Risk Assessments:  ASA:  Mallampati:  Bleeding Risk:  Creatinine:  GFR:    Associated Risk Factors:        Cerebrovascular Disease: N/A       Chronic Lung Disease: N/A       Peripheral Arterial Disease: N/A       Chronic Kidney Disease (if yes, what is GFR): N/A       Uncontrolled Diabetes (if yes, what is HgbA1C or FBS): N/A       Poorly Controlled Hypertension (if yes, what is SBP): N/A       Morbid Obesity (if yes, what is BMI): N/A       History of Recent Ventricular Arrhythmia: N/A       Inability to Ambulate Safely: N/A       Need for Therapeutic Anticoagulation: N/A       Antiplatelet or Contrast Allergy: N/A    Antianginal Therapies:        Beta Blockers:         Calcium Channel Blockers:        Long Acting Nitrates:        Ranexa:     	  ECG:     Tele: 	                                                                                                 Department of Cardiology                                                                  [facility]/[place] [address] [address] [place] [address]                                                            Telephone: [phone]. Fax:[phone]                                                                             Pre- Procedure H + P/Progress Note      HPI:  65yo female with h/o HTN, HLD, obesity, DM, current smoker, COPD, CHINO (***), GERD, Crohn's disease, PAD with reported AAA, recent JOSELINE with RLE 0.54 and LLE 1.1, CTA without mention of AAA/sizing, +high grade stenosis CIRILO/RFA/RSFA/Rpop and left popliteal artery, c/o worsening HERNANDEZ, weight gain and LE claudication symptoms, recent TTE 10/21/21 with LVEF 45-50% with WMA LAD territory, Grade I diastolic dysfunction, now plan for LHC to be performed by Dr. Tapia.        Symptoms:        Angina (Class): III       Ischemic Symptoms: HERNANDEZ (angina equivalent)    Heart Failure:        Systolic/Diastolic/Combined: yes new systolic dysfunction       NYHA Class (within 2 weeks): III    Assessment of LVEF:       EF: 45-50%       Assessed by: TTE       Date: 10/21/21           Echo 10/21/21:  LVEF 45-50% with WMA LAD territory, Grade I diastolic dysfunction  no sign valvular disease     CTA LE 11/1/21:  Diffuse atherosclerotic disease of the bilateral lower extremities.  High-grade stenoses of the right common iliac, common femoral and distal superficial femoral and popliteal arteries.  High-grade stenosis of the left popliteal artery.    Risk Assessments:  ASA:  Mallampati:  Bleeding Risk:  Creatinine:  GFR:    Associated Risk Factors:        Cerebrovascular Disease: N/A       Chronic Lung Disease: COPD       Peripheral Arterial Disease: known PAD R > L       Chronic Kidney Disease (if yes, what is GFR): N/A       Uncontrolled Diabetes (if yes, what is HgbA1C or FBS): N/A       Poorly Controlled Hypertension (if yes, what is SBP): N/A       Morbid Obesity (if yes, what is BMI): N/A       History of Recent Ventricular Arrhythmia: N/A       Inability to Ambulate Safely: N/A       Need for Therapeutic Anticoagulation: N/A       Antiplatelet or Contrast Allergy: N/A    Antianginal Therapies:        Beta Blockers:         Calcium Channel Blockers:        Long Acting Nitrates:        Ranexa:     	  ECG:     Tele: 	                                                                                                 Department of Cardiology                                                                  [facility]/[place] [address] [address]                                                            Telephone: [phone]. Fax:[phone]                                                                             Pre- Procedure H + P/Progress Note      HPI:  67yo female with h/o HTN, HLD, obesity, DM, current smoker, COPD (?***chronic steroids), CHINO (***), GERD, Crohn's disease, PAD with reported AAA, recent JOSELINE with RLE 0.54 and LLE 1.1, CTA without mention of AAA/sizing, +high grade stenosis CIRILO/RFA/RSFA/Rpop and left popliteal artery, c/o worsening HERNANDEZ, weight gain and LE claudication symptoms, recent TTE 10/21/21 with LVEF 45-50% with WMA LAD territory, Grade I diastolic dysfunction, now plan for LHC to be performed by Dr. Tapia.        Symptoms:        Angina (Class): III       Ischemic Symptoms: HERNANDEZ (angina equivalent)    Heart Failure:        Systolic/Diastolic/Combined: yes new systolic dysfunction       NYHA Class (within 2 weeks): III    Assessment of LVEF:       EF: 45-50%       Assessed by: TTE       Date: 10/21/21           Echo 10/21/21:  LVEF 45-50% with WMA LAD territory, Grade I diastolic dysfunction  no sign valvular disease     CTA LE 11/1/21:  Diffuse atherosclerotic disease of the bilateral lower extremities.  High-grade stenoses of the right common iliac, common femoral and distal superficial femoral and popliteal arteries.  High-grade stenosis of the left popliteal artery.    Risk Assessments:  ASA:  Mallampati:  Bleeding Risk:  Creatinine:  GFR:    Associated Risk Factors:        Cerebrovascular Disease: N/A       Chronic Lung Disease: COPD       Peripheral Arterial Disease: known PAD R > L       Chronic Kidney Disease (if yes, what is GFR): N/A       Uncontrolled Diabetes (if yes, what is HgbA1C or FBS): N/A       Poorly Controlled Hypertension (if yes, what is SBP): N/A       Morbid Obesity (if yes, what is BMI): N/A       History of Recent Ventricular Arrhythmia: N/A       Inability to Ambulate Safely: N/A       Need for Therapeutic Anticoagulation: N/A       Antiplatelet or Contrast Allergy: N/A    Antianginal Therapies:        Beta Blockers:         Calcium Channel Blockers:        Long Acting Nitrates:        Ranexa:     	  ECG:     Tele: 	                                                                                                 Department of Cardiology                                                                  [facility]/[place] [address] [address] [facility]                                                            Telephone: [phone]. Fax:[phone]                                                                             Pre- Procedure H + P/Progress Note      HPI:  67yo female with h/o HTN, HLD, obesity, DM, current smoker, COPD,, CHINO no treatment, GERD, Crohn's disease, PAD with recent JOSELINE with RLE 0.54 and LLE 1.1, CTA without mention of AAA/sizing, +high grade stenosis CIRILO/RFA/RSFA/Rpop and left popliteal artery, c/o worsening HERNANDEZ, weight gain and LE claudication symptoms(Vishnu class IIa), recent TTE 10/21/21 with LVEF 45-50% with WMA LAD territory, Grade I diastolic dysfunction, now plan for LHC to be performed by Dr. Tapia.        Symptoms:        Angina (Class): III       Ischemic Symptoms: HERNANDEZ (angina equivalent)    Heart Failure:        Systolic/Diastolic/Combined: yes new systolic dysfunction       NYHA Class (within 2 weeks): III    Assessment of LVEF:       EF: 45-50%       Assessed by: TTE       Date: 10/21/21           Echo 10/21/21:  LVEF 45-50% with WMA LAD territory, Grade I diastolic dysfunction  no sign valvular disease     CTA LE 11/1/21:  Diffuse atherosclerotic disease of the bilateral lower extremities.  High-grade stenoses of the right common iliac, common femoral and distal superficial femoral and popliteal arteries.  High-grade stenosis of the left popliteal artery.    Risk Assessments:  ASA:  3  Mallampati:  2  Bleeding Risk:    Creatinine:  GFR:    Associated Risk Factors:        Cerebrovascular Disease: N/A       Chronic Lung Disease: COPD       Peripheral Arterial Disease: known PAD R > L       Chronic Kidney Disease (if yes, what is GFR): N/A       Uncontrolled Diabetes (if yes, what is HgbA1C or FBS):  7.6       Poorly Controlled Hypertension (if yes, what is SBP): N/A       Morbid Obesity (if yes, what is BMI): N/A       History of Recent Ventricular Arrhythmia: N/A       Inability to Ambulate Safely: N/A       Need for Therapeutic Anticoagulation: N/A       Antiplatelet or Contrast Allergy: N/A    Antianginal Therapies:        Beta Blockers:         Calcium Channel Blockers:        Long Acting Nitrates:        Ranexa:     	  ECG:     Tele:

## 2021-11-05 NOTE — H&P PST ADULT - NSICDXPASTMEDICALHX_GEN_ALL_CORE_FT
PAST MEDICAL HISTORY:  COPD (chronic obstructive pulmonary disease)     DM (diabetes mellitus)     HLD (hyperlipidemia)     HTN (hypertension)     IBS (irritable bowel syndrome)     PAD (peripheral artery disease)

## 2021-11-06 LAB — SARS-COV-2 N GENE NPH QL NAA+PROBE: NOT DETECTED

## 2021-11-08 ENCOUNTER — TRANSCRIPTION ENCOUNTER (OUTPATIENT)
Age: 66
End: 2021-11-08

## 2021-11-08 ENCOUNTER — OUTPATIENT (OUTPATIENT)
Dept: OUTPATIENT SERVICES | Facility: HOSPITAL | Age: 66
LOS: 1 days | Discharge: HOME | End: 2021-11-08
Payer: MEDICARE

## 2021-11-08 VITALS
WEIGHT: 130.07 LBS | RESPIRATION RATE: 14 BRPM | DIASTOLIC BLOOD PRESSURE: 56 MMHG | HEIGHT: 63 IN | OXYGEN SATURATION: 93 % | HEART RATE: 74 BPM | TEMPERATURE: 98 F | SYSTOLIC BLOOD PRESSURE: 97 MMHG

## 2021-11-08 VITALS
SYSTOLIC BLOOD PRESSURE: 131 MMHG | HEART RATE: 64 BPM | OXYGEN SATURATION: 95 % | RESPIRATION RATE: 16 BRPM | DIASTOLIC BLOOD PRESSURE: 63 MMHG

## 2021-11-08 DIAGNOSIS — R93.89 ABNORMAL FINDINGS ON DIAGNOSTIC IMAGING OF OTHER SPECIFIED BODY STRUCTURES: ICD-10-CM

## 2021-11-08 DIAGNOSIS — R93.1 ABNORMAL FINDINGS ON DIAGNOSTIC IMAGING OF HEART AND CORONARY CIRCULATION: ICD-10-CM

## 2021-11-08 LAB
A1C WITH ESTIMATED AVERAGE GLUCOSE RESULT: 7.6 % — HIGH (ref 4–5.6)
ALBUMIN SERPL ELPH-MCNC: 4.4 G/DL — SIGNIFICANT CHANGE UP (ref 3.3–5.2)
ALP SERPL-CCNC: 66 U/L — SIGNIFICANT CHANGE UP (ref 40–120)
ALT FLD-CCNC: 23 U/L — SIGNIFICANT CHANGE UP
ANION GAP SERPL CALC-SCNC: 12 MMOL/L — SIGNIFICANT CHANGE UP (ref 5–17)
AST SERPL-CCNC: 19 U/L — SIGNIFICANT CHANGE UP
BASOPHILS # BLD AUTO: 0.05 K/UL — SIGNIFICANT CHANGE UP (ref 0–0.2)
BASOPHILS NFR BLD AUTO: 0.5 % — SIGNIFICANT CHANGE UP (ref 0–2)
BILIRUB SERPL-MCNC: 0.3 MG/DL — LOW (ref 0.4–2)
BUN SERPL-MCNC: 16.1 MG/DL — SIGNIFICANT CHANGE UP (ref 8–20)
CALCIUM SERPL-MCNC: 9.2 MG/DL — SIGNIFICANT CHANGE UP (ref 8.6–10.2)
CHLORIDE SERPL-SCNC: 96 MMOL/L — LOW (ref 98–107)
CHOLEST SERPL-MCNC: 117 MG/DL — SIGNIFICANT CHANGE UP
CO2 SERPL-SCNC: 28 MMOL/L — SIGNIFICANT CHANGE UP (ref 22–29)
CREAT SERPL-MCNC: 0.68 MG/DL — SIGNIFICANT CHANGE UP (ref 0.5–1.3)
EOSINOPHIL # BLD AUTO: 0.21 K/UL — SIGNIFICANT CHANGE UP (ref 0–0.5)
EOSINOPHIL NFR BLD AUTO: 1.9 % — SIGNIFICANT CHANGE UP (ref 0–6)
ESTIMATED AVERAGE GLUCOSE: 171 MG/DL — HIGH (ref 68–114)
GLUCOSE SERPL-MCNC: 195 MG/DL — HIGH (ref 70–99)
HCT VFR BLD CALC: 45.7 % — HIGH (ref 34.5–45)
HDLC SERPL-MCNC: 48 MG/DL — LOW
HGB BLD-MCNC: 15.6 G/DL — HIGH (ref 11.5–15.5)
IMM GRANULOCYTES NFR BLD AUTO: 0.4 % — SIGNIFICANT CHANGE UP (ref 0–1.5)
LIPID PNL WITH DIRECT LDL SERPL: 41 MG/DL — SIGNIFICANT CHANGE UP
LYMPHOCYTES # BLD AUTO: 2.69 K/UL — SIGNIFICANT CHANGE UP (ref 1–3.3)
LYMPHOCYTES # BLD AUTO: 24.8 % — SIGNIFICANT CHANGE UP (ref 13–44)
MAGNESIUM SERPL-MCNC: 1.7 MG/DL — SIGNIFICANT CHANGE UP (ref 1.6–2.6)
MCHC RBC-ENTMCNC: 31.8 PG — SIGNIFICANT CHANGE UP (ref 27–34)
MCHC RBC-ENTMCNC: 34.1 GM/DL — SIGNIFICANT CHANGE UP (ref 32–36)
MCV RBC AUTO: 93.3 FL — SIGNIFICANT CHANGE UP (ref 80–100)
MONOCYTES # BLD AUTO: 0.76 K/UL — SIGNIFICANT CHANGE UP (ref 0–0.9)
MONOCYTES NFR BLD AUTO: 7 % — SIGNIFICANT CHANGE UP (ref 2–14)
NEUTROPHILS # BLD AUTO: 7.08 K/UL — SIGNIFICANT CHANGE UP (ref 1.8–7.4)
NEUTROPHILS NFR BLD AUTO: 65.4 % — SIGNIFICANT CHANGE UP (ref 43–77)
NON HDL CHOLESTEROL: 69 MG/DL — SIGNIFICANT CHANGE UP
NT-PROBNP SERPL-SCNC: 13 PG/ML — SIGNIFICANT CHANGE UP (ref 0–300)
PLATELET # BLD AUTO: 330 K/UL — SIGNIFICANT CHANGE UP (ref 150–400)
POTASSIUM SERPL-MCNC: 4 MMOL/L — SIGNIFICANT CHANGE UP (ref 3.5–5.3)
POTASSIUM SERPL-SCNC: 4 MMOL/L — SIGNIFICANT CHANGE UP (ref 3.5–5.3)
PROT SERPL-MCNC: 6.7 G/DL — SIGNIFICANT CHANGE UP (ref 6.6–8.7)
RBC # BLD: 4.9 M/UL — SIGNIFICANT CHANGE UP (ref 3.8–5.2)
RBC # FLD: 13.9 % — SIGNIFICANT CHANGE UP (ref 10.3–14.5)
SODIUM SERPL-SCNC: 136 MMOL/L — SIGNIFICANT CHANGE UP (ref 135–145)
TRIGL SERPL-MCNC: 142 MG/DL — SIGNIFICANT CHANGE UP
WBC # BLD: 10.83 K/UL — HIGH (ref 3.8–10.5)
WBC # FLD AUTO: 10.83 K/UL — HIGH (ref 3.8–10.5)

## 2021-11-08 PROCEDURE — 83880 ASSAY OF NATRIURETIC PEPTIDE: CPT

## 2021-11-08 PROCEDURE — 99152 MOD SED SAME PHYS/QHP 5/>YRS: CPT

## 2021-11-08 PROCEDURE — 85025 COMPLETE CBC W/AUTO DIFF WBC: CPT

## 2021-11-08 PROCEDURE — 93010 ELECTROCARDIOGRAM REPORT: CPT

## 2021-11-08 PROCEDURE — 36415 COLL VENOUS BLD VENIPUNCTURE: CPT

## 2021-11-08 PROCEDURE — 83036 HEMOGLOBIN GLYCOSYLATED A1C: CPT

## 2021-11-08 PROCEDURE — C1894: CPT

## 2021-11-08 PROCEDURE — 75716 ARTERY X-RAYS ARMS/LEGS: CPT | Mod: 26,XU

## 2021-11-08 PROCEDURE — C1887: CPT

## 2021-11-08 PROCEDURE — 80053 COMPREHEN METABOLIC PANEL: CPT

## 2021-11-08 PROCEDURE — C1769: CPT

## 2021-11-08 PROCEDURE — 75716 ARTERY X-RAYS ARMS/LEGS: CPT | Mod: XU

## 2021-11-08 PROCEDURE — 83735 ASSAY OF MAGNESIUM: CPT

## 2021-11-08 PROCEDURE — 80061 LIPID PANEL: CPT

## 2021-11-08 PROCEDURE — 99153 MOD SED SAME PHYS/QHP EA: CPT

## 2021-11-08 PROCEDURE — 93458 L HRT ARTERY/VENTRICLE ANGIO: CPT

## 2021-11-08 PROCEDURE — 93458 L HRT ARTERY/VENTRICLE ANGIO: CPT | Mod: 26

## 2021-11-08 PROCEDURE — 93005 ELECTROCARDIOGRAM TRACING: CPT

## 2021-11-08 RX ORDER — GLIMEPIRIDE 1 MG
1 TABLET ORAL
Qty: 0 | Refills: 0 | DISCHARGE

## 2021-11-08 RX ORDER — METFORMIN HYDROCHLORIDE 850 MG/1
1 TABLET ORAL
Qty: 0 | Refills: 0 | DISCHARGE

## 2021-11-08 RX ORDER — FLUTICASONE FUROATE, UMECLIDINIUM BROMIDE AND VILANTEROL TRIFENATATE 200; 62.5; 25 UG/1; UG/1; UG/1
1 POWDER RESPIRATORY (INHALATION)
Qty: 0 | Refills: 0 | DISCHARGE

## 2021-11-08 RX ORDER — CHOLECALCIFEROL (VITAMIN D3) 125 MCG
1 CAPSULE ORAL
Qty: 0 | Refills: 0 | DISCHARGE

## 2021-11-08 RX ORDER — ASPIRIN/CALCIUM CARB/MAGNESIUM 324 MG
1 TABLET ORAL
Qty: 0 | Refills: 0 | DISCHARGE

## 2021-11-08 RX ORDER — MONTELUKAST 4 MG/1
1 TABLET, CHEWABLE ORAL
Qty: 0 | Refills: 0 | DISCHARGE

## 2021-11-08 RX ORDER — ALBUTEROL 90 UG/1
2 AEROSOL, METERED ORAL
Qty: 0 | Refills: 0 | DISCHARGE

## 2021-11-08 RX ORDER — ATORVASTATIN CALCIUM 80 MG/1
1 TABLET, FILM COATED ORAL
Qty: 0 | Refills: 0 | DISCHARGE

## 2021-11-08 NOTE — DISCHARGE NOTE PROVIDER - NSDCCPTREATMENT_GEN_ALL_CORE_FT
PRINCIPAL PROCEDURE  Procedure: Left heart catheterization  Findings and Treatment: mild CAD; smoking cessation and risk factor modification

## 2021-11-08 NOTE — CHART NOTE - NSCHARTNOTEFT_GEN_A_CORE
Removal of Femoral Sheath    Pulses in the lower extremity are palpable. The patient was placed in the supine position. The insertion site was identified.  The 5 [place] femoral sheath was then removed. Direct pressure was applied for  20 minutes.     Monitoring of the groin and both lower extremities including neuro-vascular checks and vital signs every 15 minutes x 4, then every 30 minutes x 2, then every 1 hour was ordered.    Complications: None/Other

## 2021-11-08 NOTE — DISCHARGE NOTE NURSING/CASE MANAGEMENT/SOCIAL WORK - PATIENT PORTAL LINK FT
You can access the FollowMyHealth Patient Portal offered by [facility] by registering at the following website: [email or web address]. By joining [employer]’s FollowMyHealth portal, you will also be able to view your health information using other applications (apps) compatible with our system.

## 2021-11-08 NOTE — PROGRESS NOTE ADULT - SUBJECTIVE AND OBJECTIVE BOX
Department of Cardiology                                                                  [facility]/[place] [address]  [place]-[address]                                                            Telephone: [phone]. Fax:[phone]                                                    Post- Procedure Note: Left Heart Cardiac Catheterization       Now s/p left heart catheterization via LFA after failed RRA approach performed by Dr. Tapia with mild nonobstructive CAD, EF 45%, mid inferior, inferolat hypokinesis with severe R iliac and common fem disease. She received IV sedation intraprocedurally, arrived to recovery in NAD and HDS, LFA access site stable, 5Fr sheath in place, no bleed/hematoma, distal pulse +  Plan for risk factor modification and discharge after recovery period completed.          PAST MEDICAL & SURGICAL HISTORY:  DM (diabetes mellitus)    HTN (hypertension)    IBS (irritable bowel syndrome)    COPD (chronic obstructive pulmonary disease)    HLD (hyperlipidemia)    PAD (peripheral artery disease)    No significant past surgical history      FAMILY HISTORY:    Home Medications:  albuterol 90 mcg/inh inhalation aerosol:  inhaled , As Needed (08 Nov 2021 08:18)  aspirin 81 mg oral delayed release tablet: 1 tab(s) orally once a day (08 Nov 2021 08:18)  glimepiride 2 mg oral tablet: 1 tab(s) orally 2 times a day (08 Nov 2021 08:18)  Lipitor 10 mg oral tablet: 1 tab(s) orally once a day (08 Nov 2021 08:18)  lisinopril 2.5 mg oral tablet: 1 tab(s) orally once a day (08 Nov 2021 08:18)  metFORMIN 1000 mg oral tablet: 1 tab(s) orally 2 times a day (08 Nov 2021 08:18)  montelukast 10 mg oral tablet: 1 tab(s) orally once a day (08 Nov 2021 08:18)  ProAir HFA 90 mcg/inh inhalation aerosol: 2 puff(s) inhaled every 6 hours (08 Nov 2021 08:18)  Trelegy Ellipta 200 mcg-62.5 mcg-25 mcg/inh inhalation powder: 1 puff(s) inhaled once a day (08 Nov 2021 08:18)  Vitamin D3 25 mcg (1000 intl units) oral tablet: 1 tab(s) orally once a day (08 Nov 2021 08:18)    Objective:  Vital Signs Last 24 Hrs  T(C): 36.8 (08 Nov 2021 08:19), Max: 36.8 (08 Nov 2021 08:19)  T(F): 98.3 (08 Nov 2021 08:19), Max: 98.3 (08 Nov 2021 08:19)  HR: 74 (08 Nov 2021 08:19) (74 - 74)  BP: 97/56 (08 Nov 2021 08:19) (97/56 - 97/56)  BP(mean): --  RR: 14 (08 Nov 2021 08:19) (14 - 14)  SpO2: 93% (08 Nov 2021 08:19) (93% - 93%)    CM: Paced  Neuro: A&OX3, CN 2-12 intact  HEENT: NC, AT  Lungs: exp wheeze otherwise CTA  CV: S1, S2, no murmur, RRR  Abd: Soft  Left Groin with 5Fr Sheath in place: Soft, no bleeding, no hematoma; Right radial site  no bleeding, no hematoma  Extremity: + distal pulses Left and R Radial                          15.6   10.83 )-----------( 330      ( 08 Nov 2021 07:54 )             45.7     11-08    136  |  96<L>  |  16.1  ----------------------------<  195<H>  4.0   |  28.0  |  0.68    Ca    9.2      08 Nov 2021 07:54  Mg     1.7     11-08    TPro  6.7  /  Alb  4.4  /  TBili  0.3<L>  /  DBili  x   /  AST  19  /  ALT  23  /  AlkPhos  66  11-08      65yo female with h/o HTN, HLD, obesity, DM, current smoker, COPD,, CHINO no treatment, GERD, Crohn's disease, PAD with recent JOSELINE with RLE 0.54 and LLE 1.1, CTA without mention of AAA/sizing, +high grade stenosis CIRILO/RFA/RSFA/Rpop and left popliteal artery, c/o worsening HERNANDEZ, weight gain and LE claudication symptoms(Vishnu class IIa), recent TTE 10/21/21 with LVEF 45-50% with WMA LAD territory, Grade I diastolic dysfunction, now plan for LHC to be performed by Dr. Tapia.      s/p left heart catheterization via LFA after failed RRA approach performed by Dr. Tapia with mild nonobstructive CAD, EF 45%, mid inferior, inferolat hypokinesis with severe R iliac and common fem disease.  -post cardiac cath orders  -radial and groin precautions  -bedrest x 2 hours post procedure  -continue current medical therapy  - Most former smokers quit without using one of the treatments that scientific research has shown can work. However, the following treatments are proven to be effective for smokers who want help to quit:    •Brief help by a doctor (such as when a doctor takes 10 minutes or less to give a patient advice and assistance about quitting)  •Individual, group, or telephone counseling  •Behavioral therapies (such as training in problem solving)  •Treatments with more person-to-person contact and more intensity (such as more or longer counseling sessions)  •Programs to deliver treatments using mobile phones  Medications for quitting that have been found to be effective include the following:    •Nicotine replacement products       Over-the-counter (nicotine patch [which is also available by prescription], gum, lozenge)       Prescription (nicotine patch, inhaler, nasal spray)  •Prescription non-nicotine medications: bupropion SR (Zyban®), varenicline tartrate (Chantix®)    -follow up outpt in 2 weeks with Cardiologist-Dr Schafer  [facility]

## 2021-11-08 NOTE — DISCHARGE NOTE PROVIDER - NSDCMRMEDTOKEN_GEN_ALL_CORE_FT
albuterol 90 mcg/inh inhalation aerosol:  inhaled , As Needed  aspirin 81 mg oral delayed release tablet: 1 tab(s) orally once a day  glimepiride 2 mg oral tablet: 1 tab(s) orally 2 times a day  Lipitor 10 mg oral tablet: 1 tab(s) orally once a day  lisinopril 2.5 mg oral tablet: 1 tab(s) orally once a day  metFORMIN 1000 mg oral tablet: 1 tab(s) orally 2 times a day. Resume 11/10/21 am  montelukast 10 mg oral tablet: 1 tab(s) orally once a day  ProAir HFA 90 mcg/inh inhalation aerosol: 2 puff(s) inhaled every 6 hours  Trelegy Ellipta 200 mcg-62.5 mcg-25 mcg/inh inhalation powder: 1 puff(s) inhaled once a day  Vitamin D3 25 mcg (1000 intl units) oral tablet: 1 tab(s) orally once a day

## 2021-11-08 NOTE — DISCHARGE NOTE PROVIDER - NSDCFUADDINST_GEN_ALL_CORE_FT
No heavy lifting, driving, sex, tub baths, swimming, or any activity that submerges the lower half of the body in water for 48 hours.  Limited walking and stairs for 48 hours.    Change the bandaid after 24 hours and every 24 hours after that.  Keep the puncture site dry and covered with a bandaid until a scab forms.    Observe the site frequently.  If bleeding or a large lump (the size of a golf ball or bigger) occurs lie flat, apply continuous direct pressure just above the puncture site for at least 10 minutes, and notify your physician immediately.  If the bleeding cannot be controlled, call 911 immediately for assistance.  Notify your physician of pain, swelling or any drainage.    Notify your physician immediately if coldness, numbness, discoloration or pain in your foot occurs.  Choose lean meats and poultry without skin and prepare them without added saturated and trans fat.  Eat fish at least twice a week. Recent research shows that eating oily fish containing omega-3 fatty acids (for example, salmon, trout and herring) may help lower your risk of death from coronary artery disease.  Select fat-free, 1 percent fat and low-fat dairy products.  Cut back on foods containing partially hydrogenated vegetable oils to reduce trans fat in your diet.   To lower cholesterol, reduce saturated fat to no more than 5 to 6 percent of total calories. For someone eating 2,000 calories a day, that’s about 13 grams of saturated fat.  Cut back on beverages and foods with added sugars.  Choose and prepare foods with little or no salt. To lower blood pressure, aim to eat no more than 2,400 milligrams of sodium per day. Reducing daily intake to 1,500 mg is desirable because it can lower blood pressure even further.  If you drink alcohol, drink in moderation. That means one drink per day if you’re a woman and two drinks  per day if you’re a man.  Follow the American Heart Association recommendations when you eat out, and keep an eye on your portion sizes.    Continue to follow with your primary care MD or your endocrinologist.  Follow a heart healthy diabetic diet. If you check your fingerstick glucose at home, call your MD if it is greater than 250mg/dL on 2 occasions or less than 100mg/dL on 2 occasions. Know signs of low blood sugar, such as: dizziness, shakiness, sweating, confusion, hunger, nervousness-drink 4 ounces apple juice if occurs and call your doctor. Know early signs of high blood sugar, such as: frequent urination, increased thirst, blurry vision, fatigue, headache - call your doctor if this occurs. Follow with other practitioners to care for your diabetes, such as ophthamologist and podiatrist.  Most former smokers quit without using one of the treatments that scientific research has shown can work. However, the following treatments are proven to be effective for smokers who want help to quit:    •Brief help by a doctor (such as when a doctor takes 10 minutes or less to give a patient advice and assistance about quitting)  •Individual, group, or telephone counseling  •Behavioral therapies (such as training in problem solving)  •Treatments with more person-to-person contact and more intensity (such as more or longer counseling sessions)  •Programs to deliver treatments using mobile phones  Medications for quitting that have been found to be effective include the following:    •Nicotine replacement products       Over-the-counter (nicotine patch [which is also available by prescription], gum, lozenge)       Prescription (nicotine patch, inhaler, nasal spray)  •Prescription non-nicotine medications: bupropion SR (Zyban®), varenicline tartrate (Chantix®)

## 2021-11-08 NOTE — DISCHARGE NOTE PROVIDER - NSDCCPCAREPLAN_GEN_ALL_CORE_FT
PRINCIPAL DISCHARGE DIAGNOSIS  Diagnosis: Abnormal echocardiogram  Assessment and Plan of Treatment:

## 2021-11-08 NOTE — DISCHARGE NOTE PROVIDER - HOSPITAL COURSE
65yo female with h/o HTN, HLD, obesity, DM, current smoker, COPD,, CHINO no treatment, GERD, Crohn's disease, PAD with recent JOSELINE with RLE 0.54 and LLE 1.1, CTA without mention of AAA/sizing, +high grade stenosis CIRILO/RFA/RSFA/Rpop and left popliteal artery, c/o worsening HERNANDEZ, weight gain and LE claudication symptoms(Vishnu class IIa), recent TTE 10/21/21 with LVEF 45-50% with WMA LAD territory, Grade I diastolic dysfunction, now plan for LHC to be performed by Dr. Tapia.      s/p left heart catheterization via LFA after failed RRA approach performed by Dr. Tapia with mild nonobstructive CAD, EF 45%, mid inferior, inferolat hypokinesis with severe R iliac and common fem disease.  Tolerated procedure well  Risk factor modification and smoking cessation discussed at length  Continued medical therapy

## 2021-11-08 NOTE — DISCHARGE NOTE PROVIDER - CARE PROVIDER_API CALL
Bradley Schafer)  Cardiology; Internal Medicine  [address] [place] [address], [address] [id number]  Phone: [phone]  Fax: [phone]  Follow Up Time: 2 weeks

## 2021-11-11 ENCOUNTER — NON-APPOINTMENT (OUTPATIENT)
Age: 66
End: 2021-11-11

## 2021-11-17 DIAGNOSIS — F17.200 NICOTINE DEPENDENCE, UNSPECIFIED, UNCOMPLICATED: ICD-10-CM

## 2021-11-17 DIAGNOSIS — G47.33 OBSTRUCTIVE SLEEP APNEA (ADULT) (PEDIATRIC): ICD-10-CM

## 2021-11-17 DIAGNOSIS — Z86.39 PERSONAL HISTORY OF OTHER ENDOCRINE, NUTRITIONAL AND METABOLIC DISEASE: ICD-10-CM

## 2021-11-17 DIAGNOSIS — J44.9 OBSTRUCTIVE SLEEP APNEA (ADULT) (PEDIATRIC): ICD-10-CM

## 2021-11-17 DIAGNOSIS — Z86.79 PERSONAL HISTORY OF OTHER DISEASES OF THE CIRCULATORY SYSTEM: ICD-10-CM

## 2021-11-17 DIAGNOSIS — Z87.09 PERSONAL HISTORY OF OTHER DISEASES OF THE RESPIRATORY SYSTEM: ICD-10-CM

## 2021-11-17 RX ORDER — MENTHOL/CAMPHOR 0.5 %-0.5%
1000 LOTION (ML) TOPICAL
Refills: 0 | Status: ACTIVE | COMMUNITY

## 2021-11-18 PROBLEM — E78.5 HYPERLIPIDEMIA, UNSPECIFIED: Chronic | Status: ACTIVE | Noted: 2021-11-08

## 2021-11-18 PROBLEM — I73.9 PERIPHERAL VASCULAR DISEASE, UNSPECIFIED: Chronic | Status: ACTIVE | Noted: 2021-11-08

## 2021-11-19 ENCOUNTER — APPOINTMENT (OUTPATIENT)
Dept: CARDIOLOGY | Facility: CLINIC | Age: 66
End: 2021-11-19
Payer: MEDICARE

## 2021-11-19 ENCOUNTER — NON-APPOINTMENT (OUTPATIENT)
Age: 66
End: 2021-11-19

## 2021-11-19 VITALS
HEIGHT: 63 IN | WEIGHT: 132 LBS | OXYGEN SATURATION: 94 % | BODY MASS INDEX: 23.39 KG/M2 | DIASTOLIC BLOOD PRESSURE: 79 MMHG | SYSTOLIC BLOOD PRESSURE: 128 MMHG | TEMPERATURE: 98.4 F | HEART RATE: 96 BPM

## 2021-11-19 DIAGNOSIS — I70.8 ATHEROSCLEROSIS OF OTHER ARTERIES: ICD-10-CM

## 2021-11-19 PROCEDURE — 93000 ELECTROCARDIOGRAM COMPLETE: CPT

## 2021-11-19 PROCEDURE — 99215 OFFICE O/P EST HI 40 MIN: CPT

## 2021-11-19 NOTE — HISTORY OF PRESENT ILLNESS
[FreeTextEntry1] : dyspnea on exertion \par \par HPI for today: : she had a Cardiac cath and LE angiography.  \par She had  swelling of the feet after procedure.  \par she is ioff the simvastatin. foot pain. lefgt worse./ \par \par \par \par old note: This is a 66 year old woman with history of smoking, obesity, sleep apnea, here with dyspnea on exertion \par  she saw dr. foley ,. and he reviewed her imaging. and he spotted something and recommend to see a cardiologist.\par She has COPD. she gets shortness of breathe . when  she goes upstairs. recently change in dyspnea on exertion . slowly and progressively getting worse. \par  She gained 5 lbs. \par she smoked 1.5 pack a day for 30 years. she still smoking. \par she does complain of pain in the legs and calf pain. both legs. \par \par

## 2021-11-19 NOTE — CARDIOLOGY SUMMARY
[de-identified] : 11 19 2021  Sinus  Rhythm \par -  Nonspecific T-abnormality. \par \par ABNORMAL \par \par \par 9 29 2021  Sinus  Rhythm \par  Nonspecific T-abnormality. \par \par ABNORMAL \par  [de-identified] : oct 2021:  Normal coronary arteyreies.   right leg:  Right common iliac 70%.  righty CFa : 90%. \par left leg : unremarkable .

## 2021-11-19 NOTE — DISCUSSION/SUMMARY
[Patient] : the patient [Risks] : risks [Benefits] : benefits [Alternatives] : alternatives [With Me] : with me [___ Month(s)] : in [unfilled] month(s) [FreeTextEntry1] : This is a 66 year old woman with Diabetes Mellitus and .extensive smoking history , here with dyspnea on exertion \par \par 1) dyspnea on exertion :  no coronary artery disease .  likely pulmonary . \par 2) Peripheral vascular disease :   iliac stensis. and CFA stenosis. Plan for endarterectomy. \par Appt with Dr. Nathaly Garay.  : aspirin and statins. \par 3) AAA screening: > 30 pack year history of smoking. US aorta with IVC. \par 4) left ankle pain : sprain:  Analgesic gel with ankle brace. \par \par Will order and review ECG for the above mentioned diagnosis/condition/symptoms

## 2022-01-05 ENCOUNTER — APPOINTMENT (OUTPATIENT)
Dept: VASCULAR SURGERY | Facility: CLINIC | Age: 67
End: 2022-01-05
Payer: MEDICARE

## 2022-01-05 VITALS
WEIGHT: 131 LBS | RESPIRATION RATE: 17 BRPM | HEIGHT: 61.5 IN | DIASTOLIC BLOOD PRESSURE: 82 MMHG | OXYGEN SATURATION: 95 % | TEMPERATURE: 97.2 F | HEART RATE: 93 BPM | SYSTOLIC BLOOD PRESSURE: 134 MMHG | BODY MASS INDEX: 24.42 KG/M2

## 2022-01-05 PROCEDURE — 99203 OFFICE O/P NEW LOW 30 MIN: CPT

## 2022-01-05 PROCEDURE — 93925 LOWER EXTREMITY STUDY: CPT

## 2022-01-14 RX ORDER — MONTELUKAST SODIUM 10 MG/1
10 TABLET, FILM COATED ORAL
Refills: 0 | Status: ACTIVE | COMMUNITY

## 2022-01-18 ENCOUNTER — APPOINTMENT (OUTPATIENT)
Dept: CARDIOLOGY | Facility: CLINIC | Age: 67
End: 2022-01-18

## 2022-03-22 NOTE — PROCEDURE
[FreeTextEntry1] : studies:\par 1/5/22 BLE arterial duplex: \par right: diffuse plaque in the CFA with flow restrictive lesion. Stenosis > 50%. retrograde flow in DFA. \par left: clacified plaque throughout. No flow restrictive lesion. \par \par

## 2022-03-22 NOTE — PHYSICAL EXAM
[2+] : left 2+ [0] : right 0 [Ankle Swelling (On Exam)] : not present [No Rash or Lesion] : No rash or lesion [Skin Ulcer] : no ulcer [Alert] : alert [Oriented to Person] : oriented to person [Oriented to Place] : oriented to place [Oriented to Time] : oriented to time [Calm] : calm [de-identified] : A&O x 3. NAD.  [FreeTextEntry1] : left DP and PT palpable.

## 2022-03-22 NOTE — HISTORY OF PRESENT ILLNESS
[FreeTextEntry1] : New 65 yo female with PMHx of COPD, HLD, DM II, presents for evaluation of PAD. Pt had a screening duplex which picked up plaque in aorta, no aneurysm. Pt denies claudication, she is able to walk as much as she would like. She denies any non-healing wounds or rest pain in feet.  Pt smokes 1 and 1/2 pack per day.

## 2022-03-22 NOTE — ASSESSMENT
[FreeTextEntry1] : 65 yo female with asymptomatic right CFA stenosis. No claudication, rest pain or foot wounds. Able to walk without pain \par \par Pt counseled on results of duplex and above diagnosis.\par Pt counseled on smoking cessation \par Pt counseled on importance of compliance with  ASA and atorvastatin \par RTC if claudication, leg discoloration, leg pain, swelling\par \par A total of 40 minutes was spent with patient and coordinating care\par

## 2022-05-03 ENCOUNTER — APPOINTMENT (OUTPATIENT)
Dept: CARDIOLOGY | Facility: CLINIC | Age: 67
End: 2022-05-03

## 2022-05-31 ENCOUNTER — NON-APPOINTMENT (OUTPATIENT)
Age: 67
End: 2022-05-31

## 2022-06-13 ENCOUNTER — NON-APPOINTMENT (OUTPATIENT)
Age: 67
End: 2022-06-13

## 2023-01-11 NOTE — H&P PST ADULT - RESPIRATORY
Form completed and faxed as requested to [phone]    Sent to scanning in [place]    detailed exam

## 2023-02-14 ENCOUNTER — OFFICE (OUTPATIENT)
Dept: URBAN - METROPOLITAN AREA CLINIC 114 | Facility: CLINIC | Age: 68
Setting detail: OPHTHALMOLOGY
End: 2023-02-14
Payer: MEDICARE

## 2023-02-14 DIAGNOSIS — H04.123: ICD-10-CM

## 2023-02-14 DIAGNOSIS — H01.001: ICD-10-CM

## 2023-02-14 DIAGNOSIS — E11.3313: ICD-10-CM

## 2023-02-14 DIAGNOSIS — H01.004: ICD-10-CM

## 2023-02-14 PROCEDURE — 99214 OFFICE O/P EST MOD 30 MIN: CPT | Performed by: OPHTHALMOLOGY

## 2023-02-14 PROCEDURE — 92250 FUNDUS PHOTOGRAPHY W/I&R: CPT | Performed by: OPHTHALMOLOGY

## 2023-02-14 ASSESSMENT — REFRACTION_AUTOREFRACTION
OS_AXIS: 088
OS_SPHERE: 0.00
OS_CYLINDER: -0.25
OD_CYLINDER: -0.75
OD_SPHERE: -0.25
OD_AXIS: 173

## 2023-02-14 ASSESSMENT — KERATOMETRY
OD_K2POWER_DIOPTERS: 45.75
OD_AXISANGLE_DEGREES: 087
OS_K1POWER_DIOPTERS: 45.75
OS_AXISANGLE_DEGREES: 110
OD_K1POWER_DIOPTERS: 45.00
METHOD_AUTO_MANUAL: AUTO
OS_K2POWER_DIOPTERS: 46.00

## 2023-02-14 ASSESSMENT — VISUAL ACUITY
OD_BCVA: 20/30+1
OS_BCVA: 20/20-1

## 2023-02-14 ASSESSMENT — LID POSITION - ECTROPION
OD_ECTROPION: RLL T
OS_ECTROPION: LLL T

## 2023-02-14 ASSESSMENT — TONOMETRY
OS_IOP_MMHG: 20
OD_IOP_MMHG: 20

## 2023-02-14 ASSESSMENT — CONFRONTATIONAL VISUAL FIELD TEST (CVF)
OS_FINDINGS: FULL
OD_FINDINGS: FULL

## 2023-02-14 ASSESSMENT — SPHEQUIV_DERIVED
OD_SPHEQUIV: -0.625
OS_SPHEQUIV: -0.125

## 2023-02-14 ASSESSMENT — LID EXAM ASSESSMENTS
OD_BLEPHARITIS: RUL 1+
OS_BLEPHARITIS: LUL 1+

## 2023-02-14 ASSESSMENT — AXIALLENGTH_DERIVED
OS_AL: 22.796
OD_AL: 23.1549

## 2023-03-24 ENCOUNTER — NON-APPOINTMENT (OUTPATIENT)
Age: 68
End: 2023-03-24

## 2023-04-12 ENCOUNTER — ASC (OUTPATIENT)
Dept: URBAN - METROPOLITAN AREA SURGERY 8 | Facility: SURGERY | Age: 68
Setting detail: OPHTHALMOLOGY
End: 2023-04-12
Payer: MEDICARE

## 2023-04-12 ENCOUNTER — OFFICE (OUTPATIENT)
Dept: URBAN - METROPOLITAN AREA CLINIC 94 | Facility: CLINIC | Age: 68
Setting detail: OPHTHALMOLOGY
End: 2023-04-12
Payer: MEDICARE

## 2023-04-12 DIAGNOSIS — H01.004: ICD-10-CM

## 2023-04-12 DIAGNOSIS — H01.001: ICD-10-CM

## 2023-04-12 DIAGNOSIS — E11.3311: ICD-10-CM

## 2023-04-12 DIAGNOSIS — H04.123: ICD-10-CM

## 2023-04-12 DIAGNOSIS — E11.3312: ICD-10-CM

## 2023-04-12 PROCEDURE — 92134 CPTRZ OPH DX IMG PST SGM RTA: CPT | Performed by: OPHTHALMOLOGY

## 2023-04-12 PROCEDURE — 92235 FLUORESCEIN ANGRPH MLTIFRAME: CPT | Performed by: OPHTHALMOLOGY

## 2023-04-12 PROCEDURE — 67210 TREATMENT OF RETINAL LESION: CPT | Performed by: OPHTHALMOLOGY

## 2023-04-12 PROCEDURE — 99213 OFFICE O/P EST LOW 20 MIN: CPT | Performed by: OPHTHALMOLOGY

## 2023-04-12 ASSESSMENT — KERATOMETRY
OS_K1POWER_DIOPTERS: 45.50
OD_K2POWER_DIOPTERS: 45.75
OD_AXISANGLE_DEGREES: 085
OS_AXISANGLE_DEGREES: 180
METHOD_AUTO_MANUAL: AUTO
OD_K1POWER_DIOPTERS: 45.00
OS_K2POWER_DIOPTERS: 45.75

## 2023-04-12 ASSESSMENT — AXIALLENGTH_DERIVED
OS_AL: 22.7908
OD_AL: 22.9688

## 2023-04-12 ASSESSMENT — REFRACTION_AUTOREFRACTION
OS_AXIS: 079
OS_CYLINDER: -0.75
OD_CYLINDER: -0.25
OD_SPHERE: 0.00
OD_AXIS: 172
OS_SPHERE: +0.50

## 2023-04-12 ASSESSMENT — SPHEQUIV_DERIVED
OD_SPHEQUIV: -0.125
OS_SPHEQUIV: 0.125

## 2023-04-12 ASSESSMENT — LID POSITION - ECTROPION
OS_ECTROPION: LLL T
OD_ECTROPION: RLL T

## 2023-04-12 ASSESSMENT — TONOMETRY
OS_IOP_MMHG: 20
OD_IOP_MMHG: 20

## 2023-04-12 ASSESSMENT — LID EXAM ASSESSMENTS
OS_BLEPHARITIS: LUL 1+
OD_BLEPHARITIS: RUL 1+

## 2023-04-12 ASSESSMENT — CONFRONTATIONAL VISUAL FIELD TEST (CVF)
OS_FINDINGS: FULL
OD_FINDINGS: FULL

## 2023-04-12 ASSESSMENT — VISUAL ACUITY
OS_BCVA: 20/25-1
OD_BCVA: 20/20

## 2023-04-14 ENCOUNTER — ASC (OUTPATIENT)
Dept: URBAN - METROPOLITAN AREA SURGERY 8 | Facility: SURGERY | Age: 68
Setting detail: OPHTHALMOLOGY
End: 2023-04-14
Payer: MEDICARE

## 2023-04-14 DIAGNOSIS — E11.3312: ICD-10-CM

## 2023-04-14 PROCEDURE — 67210 TREATMENT OF RETINAL LESION: CPT | Performed by: OPHTHALMOLOGY

## 2023-04-14 ASSESSMENT — KERATOMETRY
OD_K2POWER_DIOPTERS: 45.75
OS_K1POWER_DIOPTERS: 45.50
OS_K2POWER_DIOPTERS: 45.75
METHOD_AUTO_MANUAL: AUTO
OS_AXISANGLE_DEGREES: 180
OD_AXISANGLE_DEGREES: 085
OD_K1POWER_DIOPTERS: 45.00

## 2023-04-14 ASSESSMENT — VISUAL ACUITY
OD_BCVA: 20/20
OS_BCVA: 20/25-1

## 2023-04-14 ASSESSMENT — SPHEQUIV_DERIVED
OS_SPHEQUIV: 0.125
OD_SPHEQUIV: -0.125

## 2023-04-14 ASSESSMENT — REFRACTION_AUTOREFRACTION
OD_CYLINDER: -0.25
OS_CYLINDER: -0.75
OD_SPHERE: 0.00
OD_AXIS: 172
OS_SPHERE: +0.50
OS_AXIS: 079

## 2023-04-14 ASSESSMENT — AXIALLENGTH_DERIVED
OD_AL: 22.9688
OS_AL: 22.7908

## 2023-04-25 ENCOUNTER — NON-APPOINTMENT (OUTPATIENT)
Age: 68
End: 2023-04-25

## 2023-04-27 ENCOUNTER — APPOINTMENT (OUTPATIENT)
Dept: PULMONOLOGY | Facility: CLINIC | Age: 68
End: 2023-04-27
Payer: MEDICARE

## 2023-04-27 VITALS
HEART RATE: 98 BPM | DIASTOLIC BLOOD PRESSURE: 64 MMHG | HEIGHT: 61.5 IN | BODY MASS INDEX: 23.67 KG/M2 | WEIGHT: 127 LBS | RESPIRATION RATE: 16 BRPM | SYSTOLIC BLOOD PRESSURE: 140 MMHG | OXYGEN SATURATION: 94 %

## 2023-04-27 PROCEDURE — 99215 OFFICE O/P EST HI 40 MIN: CPT | Mod: 25

## 2023-04-27 PROCEDURE — 99406 BEHAV CHNG SMOKING 3-10 MIN: CPT

## 2023-04-27 PROCEDURE — G0296 VISIT TO DETERM LDCT ELIG: CPT

## 2023-04-27 RX ORDER — FLUTICASONE PROPIONATE 50 UG/1
50 SPRAY, METERED NASAL DAILY
Qty: 1 | Refills: 0 | Status: DISCONTINUED | COMMUNITY
Start: 2018-01-26 | End: 2023-04-27

## 2023-04-27 NOTE — DISCUSSION/SUMMARY
[FreeTextEntry1] : \par #1. Pt on Trelegy 200 for her moderate COPD instead of Symbicort and Spiriva.\par #2. Chest CT for lung cancer screening given smoking hx. Risks and benefits regarding screening CT discussed including but not limited to the benefit of early lung cancer detection as well as other possible unknown pathology but also risk of discovering nodules or other findings which may be benign but will require periodic evaluation. Will repeat CT given two new nodules of 4 and 6 mm in size. She is due for annual lung cancer screening\par #3. Stressed importance of smoking cessation; provided information on the [facility] program for smoking cessation in the past\par #4. F/u in 2 months with chest CT and PFTs\par #5. Encouraged exercise \par #6. Nasonex for PNDS\par #7. Consider ENT evaluation\par #8. Pt to complete abx and prednisone taper for cough, congestion, and wheeze when needed\par #9. Encouraged compliance with f/u\par #10. Pt had both Covid vaccines\par #11. Encouraged compliance with APAP for mild CHINO with an AHI of 7.7 though currently is not compliant; she is willing to consider dental evaluation for an oral appliance as alternative therapy for treatment of CHINO as pt did not tolerate CPAP therapy; could consider repeat HST to re-evaluate for possible resolution of CHINO given mild degree previously\par #12. Start home O2 at a level of 2 with portable concentrator (Inogen) and 2 lpm with sleep\par #13. Counselled pt against smoking when on or near her O2 therapy\par #14. Reviewed risks of exposure and symptoms of Covid-19 virus, including how the virus is spread and precautions to avoid silvia virus.\par \par The patient expressed understanding and agreement with the above recommendations/plan and accepts responsibility to be compliant with recommended testing, therapies, and f/u visits.\par All relevant questions and concerns were addressed.

## 2023-04-27 NOTE — REVIEW OF SYSTEMS
[Postnasal Drip] : postnasal drip [Cough] : cough [SOB on Exertion] : sob on exertion [Depression] : depression [Diabetes] : diabetes [Negative] : Neurologic

## 2023-04-27 NOTE — HISTORY OF PRESENT ILLNESS
[Doing Well] : doing well [Difficulty Breathing During Exertion] : worsened dyspnea on exertion [Feelings Of Weakness On Exertion] : worsened exercise intolerance [Cough] : worsened coughing [Coughing Up Sputum] : worsened coughing up sputum [Wheezing] : worsened wheezing [Regional Soft Tissue Swelling Both Lower Extremities] : denies lower extremity edema [URI] : upper respiratory tract infection [Adherent] : the patient is adherent with ~his/her~ medication regimen [Goals--Doing Well] : the patient is doing well with ~his/her~ COPD goals [Low Interest] : low interest in quitting [Several Times] : tried to quit several times [Weight Control] : weight control [Stress Management] : stress management [Saving Money] : saving money [Improved Health] : improved health [Smoking Addiction] : smoking addiction [Nicotine Craving] : nicotine craving [Anxiety] : anxiety [CT Scan] : a CT scan [On ___] : performed on [unfilled] [Patient] : the patient [To Assess ___] : to assess [unfilled] [Poor Compliance] : poor compliance with treatment [Follow-Up - Routine Clinic] : a routine clinic follow-up of [Dyspnea on Exertion] : dyspnea on exertion [Currently Experiencing] : The patient is currently experiencing symptoms. [None] : No associated symptoms are reported [Poor Tolerance] : poor tolerance of treatment [Poor Symptom Control] : poor symptom control [TextBox_4] : The patient was previously followed by our service during her hospitalization at [facility] when she was admitted for a COPD exacerbation.\par The patient's daughter passed away from cancer in 2013 and then her  passed in 7/2015. She had been somewhat tearful previously but appears to be doing better now. \par Pt was last seen 8/2021\par Pt with recent COPD exac for which she was started on prednisone taper and doxycylcine with improvement. CXR was clear. She will complete this course in 2 days. [FreeTextEntry5] : chest CT [FreeTextEntry8] : stable nodules and emphysematous changes [de-identified] : APAP

## 2023-04-27 NOTE — CONSULT LETTER
[Dear  ___] : Dear  [unfilled], [Consult Letter:] : I had the pleasure of evaluating your patient, [unfilled]. [Please see my note below.] : Please see my note below. [Consult Closing:] : Thank you very much for allowing me to participate in the care of this patient.  If you have any questions, please do not hesitate to contact me. [Sincerely,] : Sincerely, [FreeTextEntry3] : Curtis Hardy MD, FCCP, D. ABSM\par Pulmonary and Sleep Medicine\par [facility] at [place]

## 2023-04-27 NOTE — REASON FOR VISIT
[Follow-Up] : a follow-up visit [Abnormal CXR/ Chest CT] : an abnormal CXR/ chest CT [Sleep Apnea] : sleep apnea [COPD] : COPD [Shortness of Breath] : shortness of breath [Pulmonary Nodules] : pulmonary nodules [Nicotine Dependence] : nicotine dependence Name And Contact Information For Health Care Proxy: Maximo Vaughan Jr, , [phone] Name And Contact Information For Health Care Proxy: Maximo Vaughan Jr, , [phone]

## 2023-04-27 NOTE — COUNSELING
[Cessation strategies including cessation program discussed] : Cessation strategies including cessation program discussed [Use of nicotine replacement therapies and other medications discussed] : Use of nicotine replacement therapies and other medications discussed [Encouraged to pick a quit date and identify support needed to quit] : Encouraged to pick a quit date and identify support needed to quit [ - Annual Lung Cancer Screening/Share Decision Making Discussion] : Annual Lung Cancer Screening/Share Decision Making Discussion. (I have advised this patient to have a Low Dose CT (LDCT) scan of the lungs and have discussed the following with the patient in a shared decision making discussion:   Benefits of Detection and Early Treatment: There is adequate evidence that annual screening for lung cancer with LDCT in a population of high-risk persons can prevent a substantial number of lung cancer–related deaths. The magnitude of benefit depends on the individual patient's risk for lung cancer, as those who are at highest risk are most likely to benefit. Screening cannot prevent most lung cancer–related deaths, and does not replace smoking cessation. Harms of Detection and Early Intervention and Treatment: The harms associated with LDCT screening include false-negative and false-positive results, incidental findings, over diagnosis, and radiation exposure. False-positive LDCT results occur in a substantial proportion of screened persons; 95% of all positive results do not lead to a diagnosis of cancer. In a high-quality screening program, further imaging can resolve most false-positive results; however, some patients may require invasive procedures. Radiation harms, including cancer resulting from cumulative exposure to radiation, vary depending on the age at the start of screening; the number of scans received; and the person's exposure to other sources of radiation, particularly other medical imaging.) [FreeTextEntry1] : 4

## 2023-05-16 ENCOUNTER — NON-APPOINTMENT (OUTPATIENT)
Age: 68
End: 2023-05-16

## 2023-05-16 VITALS — HEIGHT: 61.5 IN | WEIGHT: 127 LBS | BODY MASS INDEX: 23.67 KG/M2

## 2023-05-16 DIAGNOSIS — F17.210 NICOTINE DEPENDENCE, CIGARETTES, UNCOMPLICATED: ICD-10-CM

## 2023-05-16 DIAGNOSIS — F17.200 NICOTINE DEPENDENCE, UNSPECIFIED, UNCOMPLICATED: ICD-10-CM

## 2023-05-16 NOTE — HISTORY OF PRESENT ILLNESS
[Current] : Current [TextBox_13] : Referred by Dr. Curtis Hardy.\par \par Ms. CLARK is a 68 year old female with a history of HTN, HLD, PAD, COPD and emphysema.\par \par She was called to review eligibility for Low-Dose CT lung cancer screening.  Reviewed and confirmed that the patient meets screening eligibility criteria:\par \par 68 years old \par \par Smoking Status: Current Smoker\par \par Number of pack(s) per day: 1\par Number of years smoked: 49\par Number of pack years smokin\par \par No symptoms of lung cancer, including new cough, change in cough, hemoptysis, and unintentional weight loss.\par \par No personal history of lung cancer.  No lung cancer in a first degree relative.  No history of occupational exposures. [PacksperYear] : 49 Yes

## 2023-05-17 ENCOUNTER — APPOINTMENT (OUTPATIENT)
Dept: CT IMAGING | Facility: CLINIC | Age: 68
End: 2023-05-17
Payer: MEDICARE

## 2023-05-17 ENCOUNTER — OUTPATIENT (OUTPATIENT)
Dept: OUTPATIENT SERVICES | Facility: HOSPITAL | Age: 68
LOS: 1 days | End: 2023-05-17
Payer: MEDICARE

## 2023-05-17 DIAGNOSIS — F17.200 NICOTINE DEPENDENCE, UNSPECIFIED, UNCOMPLICATED: ICD-10-CM

## 2023-05-17 PROCEDURE — 71271 CT THORAX LUNG CANCER SCR C-: CPT | Mod: 26

## 2023-05-17 PROCEDURE — 71271 CT THORAX LUNG CANCER SCR C-: CPT

## 2023-06-15 ENCOUNTER — APPOINTMENT (OUTPATIENT)
Dept: NUCLEAR MEDICINE | Facility: CLINIC | Age: 68
End: 2023-06-15

## 2023-07-12 ENCOUNTER — APPOINTMENT (OUTPATIENT)
Dept: NUCLEAR MEDICINE | Facility: CLINIC | Age: 68
End: 2023-07-12

## 2023-07-19 ENCOUNTER — APPOINTMENT (OUTPATIENT)
Dept: PULMONOLOGY | Facility: CLINIC | Age: 68
End: 2023-07-19

## 2023-08-07 ENCOUNTER — APPOINTMENT (OUTPATIENT)
Dept: PULMONOLOGY | Facility: CLINIC | Age: 68
End: 2023-08-07

## 2023-08-08 ENCOUNTER — APPOINTMENT (OUTPATIENT)
Dept: PULMONOLOGY | Facility: CLINIC | Age: 68
End: 2023-08-08
Payer: MEDICARE

## 2023-08-08 VITALS — OXYGEN SATURATION: 93 % | HEART RATE: 101 BPM | RESPIRATION RATE: 16 BRPM

## 2023-08-08 VITALS — DIASTOLIC BLOOD PRESSURE: 80 MMHG | BODY MASS INDEX: 24.54 KG/M2 | SYSTOLIC BLOOD PRESSURE: 140 MMHG | WEIGHT: 132 LBS

## 2023-08-08 PROCEDURE — 99215 OFFICE O/P EST HI 40 MIN: CPT | Mod: 25

## 2023-08-08 PROCEDURE — 99406 BEHAV CHNG SMOKING 3-10 MIN: CPT

## 2023-08-08 NOTE — COUNSELING

## 2023-08-08 NOTE — RESULTS/DATA
[TextEntry] : Home PSG from 8/3/20 revealed mild CHINO with an AHI of 7.7 The patient's overall compliance was 27% with a >4hr compliance of 3% on autoCPAP with a median and max pressure of 4 and 5.8 cm of water, respectively with a residual AHI of 1 which was normal though now is not compliant. CXR from 4/24/23 was clear with COPD - reviewed results and films with patient

## 2023-08-08 NOTE — CONSULT LETTER
[Dear  ___] : Dear  [unfilled], [Consult Letter:] : I had the pleasure of evaluating your patient, [unfilled]. [Please see my note below.] : Please see my note below. [Consult Closing:] : Thank you very much for allowing me to participate in the care of this patient.  If you have any questions, please do not hesitate to contact me. [Sincerely,] : Sincerely, [FreeTextEntry3] : Curtis Hardy MD, FCCP, D. ABSM\par  Pulmonary and Sleep Medicine\par  [facility] at [place]

## 2023-08-08 NOTE — DISCUSSION/SUMMARY
[FreeTextEntry1] : #1. Pt on Trelegy 200 for her moderate COPD instead of Symbicort and Spiriva. #2. Chest CT for lung cancer screening given smoking hx revealed two new nodules of 4 and 6 mm in size and now with repeat CT which revealed a 1.2x0.6 cm nodule; will repeat for 3 month f/u #3. Stressed importance of smoking cessation; provided information on the [facility] program for smoking cessation in the past #4. F/u in 1 months with chest CT and vic; eventual full PFTs #5. Encouraged exercise  #6. Nasonex for PNDS #7. Consider ENT evaluation #8. Prednisone taper for wheeze #9. Encouraged compliance with f/u #10. Pt had both Covid vaccines #11. Encouraged compliance with APAP for mild CHINO with an AHI of 7.7 though currently is not compliant; she is willing to consider dental evaluation for an oral appliance as alternative therapy for treatment of CHINO as pt did not tolerate CPAP therapy but does not necessarily require therapy given mild degree of CHINO. Could consider repeat HST to re-evaluate for possible resolution of CHINO given mild degree. #12. Continue home O2 at a level of 2 with portable concentrator (Inogen) and 2 lpm with sleep. #13. Counselled pt against smoking when on or near her O2 therapy.  The patient expressed understanding and agreement with the above recommendations/plan and accepts responsibility to be compliant with recommended testing, therapies, and f/u visits. All relevant questions and concerns were addressed.

## 2023-08-08 NOTE — END OF VISIT
[Time Spent: ___ minutes] : I have spent [unfilled] minutes of time on the encounter. [TextEntry] : Discussed with pt at length regarding COPD, smoking cessation, pulmonary nodule, PNDS, CHINO, overweight, hypoxia, lung cancer screening; reviewed prior w/u with pt as above.

## 2023-08-08 NOTE — HISTORY OF PRESENT ILLNESS
[Dyspnea on Exertion] : dyspnea on exertion [Low Interest] : low interest in quitting [Several Times] : tried to quit several times [Weight Control] : weight control [Stress Management] : stress management [Saving Money] : saving money [Improved Health] : improved health [Smoking Addiction] : smoking addiction [Nicotine Craving] : nicotine craving [Anxiety] : anxiety [Currently Experiencing] : The patient is currently experiencing symptoms. [CT Scan] : a CT scan [On ___] : performed on [unfilled] [Patient] : the patient [To Assess ___] : to assess [unfilled] [Follow-Up - Routine Clinic] : a routine clinic follow-up of [None] : No associated symptoms are reported [Poor Compliance] : poor compliance with treatment [Poor Tolerance] : poor tolerance of treatment [Poor Symptom Control] : poor symptom control [TextBox_4] : The patient was previously followed by our service during her hospitalization at [facility] when she was admitted for a COPD exacerbation. The patient's daughter passed away from cancer in 2013 and then her  passed in 7/2015. She had been somewhat tearful previously but appears to be doing better now.  Pt was last seen 8/2021 Pt with prior COPD exac for which she was started on prednisone taper and doxycylcine with improvement. CXR was clear.  [FreeTextEntry5] : chest CT [FreeTextEntry8] : stable nodules and emphysematous changes [de-identified] : APAP [TextEntry] : Chest CT from 8/31/13 reveals stable subcentimeter pulmonary nodules and emphysematous changes.  Last chest CT from 12/15/13 revealed stable changes.  Chest CT from 12/10/14 appears to reveal no new changes.  Chest CT from 12/1/15 is revealed stable changes. Chest CT from 12/8/16 revealed stable change  Chest CT from 5/2/18 revealed essentially stable change with emphysematous changes, mild scarring and 2 mm nodule. Chest CT from 11/16/20 revealed stable changes with two new nodules measuring 4 and 6 mm in size. Chest CT from 8/17/21 revealed emphysematous changes and stable nodules Chest CT from 5/17/23 revealed a LLL 1.2 x 0.6 cm nodule.

## 2023-08-18 ENCOUNTER — OUTPATIENT (OUTPATIENT)
Dept: OUTPATIENT SERVICES | Facility: HOSPITAL | Age: 68
LOS: 1 days | End: 2023-08-18
Payer: MEDICARE

## 2023-08-18 ENCOUNTER — APPOINTMENT (OUTPATIENT)
Dept: CT IMAGING | Facility: CLINIC | Age: 68
End: 2023-08-18
Payer: MEDICARE

## 2023-08-18 DIAGNOSIS — R93.89 ABNORMAL FINDINGS ON DIAGNOSTIC IMAGING OF OTHER SPECIFIED BODY STRUCTURES: ICD-10-CM

## 2023-08-18 PROCEDURE — 71250 CT THORAX DX C-: CPT | Mod: 26,MH

## 2023-08-18 PROCEDURE — 71250 CT THORAX DX C-: CPT

## 2023-09-07 ENCOUNTER — APPOINTMENT (OUTPATIENT)
Dept: PULMONOLOGY | Facility: CLINIC | Age: 68
End: 2023-09-07
Payer: MEDICARE

## 2023-09-07 VITALS
RESPIRATION RATE: 16 BRPM | DIASTOLIC BLOOD PRESSURE: 78 MMHG | HEART RATE: 95 BPM | SYSTOLIC BLOOD PRESSURE: 126 MMHG | OXYGEN SATURATION: 93 %

## 2023-09-07 VITALS — HEIGHT: 61 IN | BODY MASS INDEX: 25.49 KG/M2 | WEIGHT: 135 LBS

## 2023-09-07 PROCEDURE — 94010 BREATHING CAPACITY TEST: CPT

## 2023-09-07 PROCEDURE — 99214 OFFICE O/P EST MOD 30 MIN: CPT | Mod: 25

## 2023-09-07 NOTE — CONSULT LETTER
[Dear  ___] : Dear  [unfilled], [Consult Letter:] : I had the pleasure of evaluating your patient, [unfilled]. [Please see my note below.] : Please see my note below. [Consult Closing:] : Thank you very much for allowing me to participate in the care of this patient.  If you have any questions, please do not hesitate to contact me. [Sincerely,] : Sincerely, [FreeTextEntry3] : Curtis Hardy MD, FCCP, D. ABSM\par  Pulmonary and Sleep Medicine\par  [facility] at [place]

## 2023-09-07 NOTE — HISTORY OF PRESENT ILLNESS
[Dyspnea on Exertion] : dyspnea on exertion [Low Interest] : low interest in quitting [Several Times] : tried to quit several times [Weight Control] : weight control [Stress Management] : stress management [Saving Money] : saving money [Improved Health] : improved health [Smoking Addiction] : smoking addiction [Nicotine Craving] : nicotine craving [Anxiety] : anxiety [Currently Experiencing] : The patient is currently experiencing symptoms. [CT Scan] : a CT scan [On ___] : performed on [unfilled] [Patient] : the patient [To Assess ___] : to assess [unfilled] [Follow-Up - Routine Clinic] : a routine clinic follow-up of [None] : No associated symptoms are reported [Poor Compliance] : poor compliance with treatment [Poor Tolerance] : poor tolerance of treatment [Poor Symptom Control] : poor symptom control [TextBox_4] : The patient was previously followed by our service during her hospitalization at [facility] when she was admitted for a COPD exacerbation. The patient's daughter passed away from cancer in 2013 and then her  passed in 7/2015. She had been somewhat tearful previously but appears to be doing better now.  Pt was last seen 8/2021 Pt with prior COPD exac for which she was started on prednisone taper and doxycylcine with improvement. CXR was clear.  [de-identified] : APAP [FreeTextEntry5] : chest CT [FreeTextEntry8] : stable nodules and emphysematous changes [TextEntry] : Chest CT from 8/31/13 reveals stable subcentimeter pulmonary nodules and emphysematous changes.  Last chest CT from 12/15/13 revealed stable changes.  Chest CT from 12/10/14 appears to reveal no new changes.  Chest CT from 12/1/15 is revealed stable changes. Chest CT from 12/8/16 revealed stable change  Chest CT from 5/2/18 revealed essentially stable change with emphysematous changes, mild scarring and 2 mm nodule. Chest CT from 11/16/20 revealed stable changes with two new nodules measuring 4 and 6 mm in size. Chest CT from 8/17/21 revealed emphysematous changes and stable nodules Chest CT from 5/17/23 revealed a LLL 1.2 x 0.6 cm nodule.  Chest CT from 8/18/23 revealed a stable LLL 1.2 x 0.6 cm nodule.

## 2023-09-07 NOTE — PROCEDURE
[FreeTextEntry1] : PFTs performed previously revealed moderate to severe COPD and was near baseline. Lung volumes were normal but diffusion capacity was moderately reduced though nearly corrected for alveolar volume. PFTs subsequently were essentially unchanged Spirometry 10/1/18 - severe COPD and worse than her previous PFTs 8/13/21 - Moderately reduced FVC with severely reduced FEV1 with obstruction with near normal lung volumes and mildly reduced DLCO which corrected for alveolar volume.  [place] 9/7/23 - Mildly reduced FVC with severely reduced FEV1 with obstructive pattern without change; near baseline.

## 2023-09-07 NOTE — DISCUSSION/SUMMARY
[FreeTextEntry1] : #1. Pt on Trelegy 200 for her moderate COPD instead of Symbicort and Spiriva. #2. Chest CT for lung cancer screening given smoking hx revealed two new nodules of 4 and 6 mm in size and now with repeat CT which revealed a 1.2x0.6 cm nodule; repeat with stable but persistent nodule. #3. Stressed importance of smoking cessation; provided information on the [facility] program for smoking cessation in the past. #4. F/u in 4 months with chest CT. #5. Encouraged exercise. #6. Nasonex for PNDS. #7. Consider ENT evaluation. #8. Prednisone taper for wheeze as needed. #9. Encouraged compliance with f/u. #10. Pt had both Covid vaccines. #11. Encouraged compliance with APAP for mild CHINO with an AHI of 7.7 though currently is not compliant; she is willing to consider dental evaluation for an oral appliance as alternative therapy for treatment of CHINO as pt did not tolerate CPAP therapy but does not necessarily require therapy given mild degree of CHINO. Could consider repeat HST to re-evaluate for possible resolution of CHINO given mild degree. #12. Continue home O2 at a level of 2 with portable concentrator (Inogen) and 2 lpm with sleep. #13. Counselled pt against smoking when on or near her O2 therapy.  The patient expressed understanding and agreement with the above recommendations/plan and accepts responsibility to be compliant with recommended testing, therapies, and f/u visits. All relevant questions and concerns were addressed.

## 2023-11-28 ENCOUNTER — OFFICE (OUTPATIENT)
Dept: URBAN - METROPOLITAN AREA CLINIC 114 | Facility: CLINIC | Age: 68
Setting detail: OPHTHALMOLOGY
End: 2023-11-28
Payer: MEDICARE

## 2023-11-28 DIAGNOSIS — E11.3291: ICD-10-CM

## 2023-11-28 DIAGNOSIS — H01.004: ICD-10-CM

## 2023-11-28 DIAGNOSIS — H01.001: ICD-10-CM

## 2023-11-28 DIAGNOSIS — E11.3292: ICD-10-CM

## 2023-11-28 DIAGNOSIS — H04.123: ICD-10-CM

## 2023-11-28 PROCEDURE — 92014 COMPRE OPH EXAM EST PT 1/>: CPT | Performed by: OPHTHALMOLOGY

## 2023-11-28 PROCEDURE — 92250 FUNDUS PHOTOGRAPHY W/I&R: CPT | Performed by: OPHTHALMOLOGY

## 2023-11-28 ASSESSMENT — SPHEQUIV_DERIVED
OD_SPHEQUIV: -0.75
OS_SPHEQUIV: -0.25

## 2023-11-28 ASSESSMENT — CONFRONTATIONAL VISUAL FIELD TEST (CVF)
OS_FINDINGS: FULL
OD_FINDINGS: FULL

## 2023-11-28 ASSESSMENT — REFRACTION_AUTOREFRACTION
OS_SPHERE: 0.00
OD_SPHERE: -0.50
OD_AXIS: 164
OS_CYLINDER: -0.50
OD_CYLINDER: -0.50
OS_AXIS: 082

## 2023-11-28 ASSESSMENT — LID POSITION - ECTROPION
OD_ECTROPION: RLL T
OS_ECTROPION: LLL T

## 2023-11-28 ASSESSMENT — LID EXAM ASSESSMENTS
OD_BLEPHARITIS: RUL 1+
OS_BLEPHARITIS: LUL 1+

## 2023-12-28 ENCOUNTER — APPOINTMENT (OUTPATIENT)
Dept: CT IMAGING | Facility: CLINIC | Age: 68
End: 2023-12-28
Payer: MEDICARE

## 2023-12-28 ENCOUNTER — OUTPATIENT (OUTPATIENT)
Dept: OUTPATIENT SERVICES | Facility: HOSPITAL | Age: 68
LOS: 1 days | End: 2023-12-28
Payer: MEDICARE

## 2023-12-28 DIAGNOSIS — R91.8 OTHER NONSPECIFIC ABNORMAL FINDING OF LUNG FIELD: ICD-10-CM

## 2023-12-28 DIAGNOSIS — R93.89 ABNORMAL FINDINGS ON DIAGNOSTIC IMAGING OF OTHER SPECIFIED BODY STRUCTURES: ICD-10-CM

## 2023-12-28 PROCEDURE — 71250 CT THORAX DX C-: CPT | Mod: 26,MH

## 2023-12-28 PROCEDURE — 71250 CT THORAX DX C-: CPT

## 2024-01-10 ENCOUNTER — APPOINTMENT (OUTPATIENT)
Dept: PULMONOLOGY | Facility: CLINIC | Age: 69
End: 2024-01-10

## 2024-02-28 ENCOUNTER — APPOINTMENT (OUTPATIENT)
Dept: PULMONOLOGY | Facility: CLINIC | Age: 69
End: 2024-02-28

## 2024-02-28 VITALS
SYSTOLIC BLOOD PRESSURE: 120 MMHG | DIASTOLIC BLOOD PRESSURE: 74 MMHG | BODY MASS INDEX: 26.43 KG/M2 | WEIGHT: 140 LBS | RESPIRATION RATE: 16 BRPM | HEIGHT: 61 IN

## 2024-02-28 VITALS — HEART RATE: 90 BPM | OXYGEN SATURATION: 93 %

## 2024-02-28 RX ORDER — DAPAGLIFLOZIN 10 MG/1
10 TABLET, FILM COATED ORAL
Refills: 0 | Status: ACTIVE | COMMUNITY

## 2024-02-28 RX ORDER — INSULIN GLARGINE 100 [IU]/ML
INJECTION, SOLUTION SUBCUTANEOUS
Refills: 0 | Status: DISCONTINUED | COMMUNITY
End: 2024-02-28

## 2024-02-28 RX ORDER — PREDNISONE 10 MG/1
10 TABLET ORAL
Qty: 50 | Refills: 0 | Status: DISCONTINUED | COMMUNITY
Start: 2023-08-08 | End: 2024-02-28

## 2024-04-16 ENCOUNTER — APPOINTMENT (OUTPATIENT)
Dept: PULMONOLOGY | Facility: CLINIC | Age: 69
End: 2024-04-16
Payer: MEDICARE

## 2024-04-16 VITALS
HEART RATE: 93 BPM | SYSTOLIC BLOOD PRESSURE: 102 MMHG | OXYGEN SATURATION: 94 % | HEIGHT: 61 IN | BODY MASS INDEX: 25.51 KG/M2 | DIASTOLIC BLOOD PRESSURE: 62 MMHG | RESPIRATION RATE: 16 BRPM | WEIGHT: 135.13 LBS

## 2024-04-16 DIAGNOSIS — J43.2 CENTRILOBULAR EMPHYSEMA: ICD-10-CM

## 2024-04-16 DIAGNOSIS — R91.8 OTHER NONSPECIFIC ABNORMAL FINDING OF LUNG FIELD: ICD-10-CM

## 2024-04-16 DIAGNOSIS — F17.200 NICOTINE DEPENDENCE, UNSPECIFIED, UNCOMPLICATED: ICD-10-CM

## 2024-04-16 DIAGNOSIS — J44.9 CHRONIC OBSTRUCTIVE PULMONARY DISEASE, UNSPECIFIED: ICD-10-CM

## 2024-04-16 DIAGNOSIS — R93.89 ABNORMAL FINDINGS ON DIAGNOSTIC IMAGING OF OTHER SPECIFIED BODY STRUCTURES: ICD-10-CM

## 2024-04-16 DIAGNOSIS — J96.11 CHRONIC RESPIRATORY FAILURE WITH HYPOXIA: ICD-10-CM

## 2024-04-16 DIAGNOSIS — E66.3 OVERWEIGHT: ICD-10-CM

## 2024-04-16 DIAGNOSIS — R05.9 COUGH, UNSPECIFIED: ICD-10-CM

## 2024-04-16 DIAGNOSIS — R09.82 POSTNASAL DRIP: ICD-10-CM

## 2024-04-16 PROCEDURE — 99214 OFFICE O/P EST MOD 30 MIN: CPT

## 2024-04-16 PROCEDURE — G2211 COMPLEX E/M VISIT ADD ON: CPT

## 2024-04-16 PROCEDURE — 99406 BEHAV CHNG SMOKING 3-10 MIN: CPT

## 2024-04-16 NOTE — DISCUSSION/SUMMARY
[FreeTextEntry1] : #1. Pt on Trelegy 200 for her moderate COPD instead of Symbicort and Spiriva. #2. Chest CT for lung cancer screening given smoking hx revealed two new nodules of 4 and 6 mm in size and now with repeat CT which revealed stable nodules and emphysema. Will resume annual lung cancer screening CT in 1/2025. #3. Stressed importance of smoking cessation; provided information on the [facility] program for smoking cessation in the past. #4. F/u in 4 months with PFTs. #5. Encouraged exercise. #6. Nasonex for PNDS. #7. Consider ENT evaluation. #8. Prednisone taper for wheeze as needed. #9. Encouraged compliance with f/u. #10. Pt had both Covid vaccines. #11. Encouraged compliance with APAP for mild CHINO with an AHI of 7.7 though currently is not compliant; she is willing to consider dental evaluation for an oral appliance as alternative therapy for treatment of CHINO as pt did not tolerate CPAP therapy but does not necessarily require therapy given mild degree of CHINO. Could consider repeat HST to re-evaluate for possible resolution of CHINO given mild degree of symptoms. #12. Continue home O2 at a level of 2 with portable concentrator (Inogen) and 2 lpm with sleep. #13. Counselled pt against smoking when on or near her O2 therapy.  The patient expressed understanding and agreement with the above recommendations/plan and accepts responsibility to be compliant with recommended testing, therapies, and f/u visits. All relevant questions and concerns were addressed.

## 2024-04-16 NOTE — PROCEDURE
[FreeTextEntry1] : PFTs performed previously revealed moderate to severe COPD and was near baseline. Lung volumes were normal but diffusion capacity was moderately reduced though nearly corrected for alveolar volume. PFTs subsequently were essentially unchanged Spirometry 10/1/18 - severe COPD and worse than her previous PFTs 8/13/21 - Moderately reduced FVC with severely reduced FEV1 with obstruction with near normal lung volumes and mildly reduced DLCO which corrected for alveolar volume.  [place] 9/7/23 - Mildly reduced FVC with severely reduced FEV1 with obstructive pattern without change; near baseline.

## 2024-04-16 NOTE — CONSULT LETTER
[Dear  ___] : Dear  [unfilled], [Consult Letter:] : I had the pleasure of evaluating your patient, [unfilled]. [Please see my note below.] : Please see my note below. [Consult Closing:] : Thank you very much for allowing me to participate in the care of this patient.  If you have any questions, please do not hesitate to contact me. [Sincerely,] : Sincerely, [FreeTextEntry3] : Curtis Hardy MD, FCCP, D. ABSM\par  Pulmonary and Sleep Medicine\par  [facility] at [place]

## 2024-04-16 NOTE — HISTORY OF PRESENT ILLNESS
[Dyspnea on Exertion] : dyspnea on exertion [Low Interest] : low interest in quitting [Several Times] : tried to quit several times [Weight Control] : weight control [Stress Management] : stress management [Saving Money] : saving money [Improved Health] : improved health [Smoking Addiction] : smoking addiction [Nicotine Craving] : nicotine craving [Anxiety] : anxiety [Currently Experiencing] : The patient is currently experiencing symptoms. [Follow-Up - Routine Clinic] : a routine clinic follow-up of [None] : No associated symptoms are reported [Poor Compliance] : poor compliance with treatment [Poor Tolerance] : poor tolerance of treatment [Poor Symptom Control] : poor symptom control [CT Scan] : a CT scan [On ___] : performed on [unfilled] [Patient] : the patient [To Assess ___] : to assess [unfilled] [TextBox_4] : The patient was previously followed by our service during her hospitalization at [facility] when she was admitted for a COPD exacerbation. The patient's daughter passed away from cancer in 2013 and then her  passed in 7/2015. She had been somewhat tearful previously but appears to be doing better now.  Pt was last seen 8/2021 Pt with prior COPD exac for which she was started on prednisone taper and doxycylcine with improvement. CXR was clear.  [de-identified] : APAP [FreeTextEntry5] : chest CT [FreeTextEntry8] : stable nodules and emphysematous changes [TextEntry] : Chest CT from 8/31/13 reveals stable subcentimeter pulmonary nodules and emphysematous changes.  Last chest CT from 12/15/13 revealed stable changes.  Chest CT from 12/10/14 appears to reveal no new changes.  Chest CT from 12/1/15 is revealed stable changes. Chest CT from 12/8/16 revealed stable change  Chest CT from 5/2/18 revealed essentially stable change with emphysematous changes, mild scarring and 2 mm nodule. Chest CT from 11/16/20 revealed stable changes with two new nodules measuring 4 and 6 mm in size. Chest CT from 8/17/21 revealed emphysematous changes and stable nodules Chest CT from 5/17/23 revealed a LLL 1.2 x 0.6 cm nodule.  Chest CT from 8/18/23 revealed a stable LLL 1.2 x 0.6 cm nodule. Chest CT from 12/29/23 revealed stable nodules with emphysema - reviewed results and films with patient.

## 2024-04-16 NOTE — RESULTS/DATA
Bob Pruitt(Resident) [TextEntry] : Home PSG from 8/3/20 revealed mild CHINO with an AHI of 7.7 The patient's overall compliance was 27% with a >4hr compliance of 3% on autoCPAP with a median and max pressure of 4 and 5.8 cm of water, respectively with a residual AHI of 1 which was normal though now is not compliant. CXR from 4/24/23 was clear with COPD - reviewed results and films with patient

## 2024-05-15 ENCOUNTER — APPOINTMENT (OUTPATIENT)
Dept: CARDIOLOGY | Facility: CLINIC | Age: 69
End: 2024-05-15

## 2024-05-17 ENCOUNTER — NON-APPOINTMENT (OUTPATIENT)
Age: 69
End: 2024-05-17

## 2024-05-17 ENCOUNTER — APPOINTMENT (OUTPATIENT)
Dept: CARDIOLOGY | Facility: CLINIC | Age: 69
End: 2024-05-17
Payer: MEDICARE

## 2024-05-17 VITALS — BODY MASS INDEX: 25.3 KG/M2 | WEIGHT: 134 LBS | HEIGHT: 61 IN

## 2024-05-17 VITALS — OXYGEN SATURATION: 95 % | DIASTOLIC BLOOD PRESSURE: 72 MMHG | HEART RATE: 82 BPM | SYSTOLIC BLOOD PRESSURE: 128 MMHG

## 2024-05-17 DIAGNOSIS — R93.1 ABNORMAL FINDINGS ON DIAGNOSTIC IMAGING OF HEART AND CORONARY CIRCULATION: ICD-10-CM

## 2024-05-17 DIAGNOSIS — G47.33 OBSTRUCTIVE SLEEP APNEA (ADULT) (PEDIATRIC): ICD-10-CM

## 2024-05-17 DIAGNOSIS — I73.9 PERIPHERAL VASCULAR DISEASE, UNSPECIFIED: ICD-10-CM

## 2024-05-17 DIAGNOSIS — E78.5 HYPERLIPIDEMIA, UNSPECIFIED: ICD-10-CM

## 2024-05-17 DIAGNOSIS — I10 ESSENTIAL (PRIMARY) HYPERTENSION: ICD-10-CM

## 2024-05-17 DIAGNOSIS — R06.02 SHORTNESS OF BREATH: ICD-10-CM

## 2024-05-17 PROCEDURE — G2211 COMPLEX E/M VISIT ADD ON: CPT

## 2024-05-17 PROCEDURE — 99215 OFFICE O/P EST HI 40 MIN: CPT

## 2024-05-17 PROCEDURE — 93000 ELECTROCARDIOGRAM COMPLETE: CPT

## 2024-05-17 RX ORDER — CHLORHEXIDINE GLUCONATE 4 %
1000 LIQUID (ML) TOPICAL DAILY
Refills: 0 | Status: ACTIVE | COMMUNITY

## 2024-05-17 NOTE — DISCUSSION/SUMMARY
[Patient] : the patient [Risks] : risks [Benefits] : benefits [Alternatives] : alternatives [With Me] : with me [___ Month(s)] : in [unfilled] month(s) [FreeTextEntry1] : This is a 66 year old woman with Diabetes Mellitus and .extensive smoking history , here with dyspnea on exertion   1) fatigue and tiredness: dizziness: Carotid US ,  TSH,.  BNP.  symptoms related to sleep apnea. does not wear cpap 2) LV dysfunciton:  no zwanger paseri echo . 2 different ef reported in conclusion: repeat transthoracic echocardiogram .  BNP check.   3) dyspnea on exertion :  no coronary artery disease .  likely pulmonary .  4) Peripheral vascular disease : asymptomatic iliac stenosis. and CFA stenosis.  repeat Rhett and US arterial Duplex  aspirin and statins.  3) AAA screening:  No aneurysm.  4) left ankle pain : sprain:  Analgesic gel with ankle brace.   Will order and review ECG for the above mentioned diagnosis/condition/symptoms  [EKG obtained to assist in diagnosis and management of assessed problem(s)] : EKG obtained to assist in diagnosis and management of assessed problem(s)

## 2024-05-17 NOTE — HISTORY OF PRESENT ILLNESS
[FreeTextEntry1] : dyspnea on exertion   HPI for today:  she does not wear a cpap machine. she is feeliogn fatigue and tired.  and listness.  and no dizizness. no syncope.  she got an echo done at [facility].   mague thomas:  : she had a Cardiac cath and LE angiography.   She had  swelling of the feet after procedure.   she is ioff the simvastatin. foot pain. lefgt worse./     old note: This is a 66 year old woman with history of smoking, obesity, sleep apnea, here with dyspnea on exertion   she saw dr. foley ,. and he reviewed her imaging. and he spotted something and recommend to see a cardiologist. She has COPD. she gets shortness of breathe . when  she goes upstairs. recently change in dyspnea on exertion . slowly and progressively getting worse.   She gained 5 lbs.  she smoked 1.5 pack a day for 30 years. she still smoking.  she does complain of pain in the legs and calf pain. both legs.   
0.14

## 2024-05-17 NOTE — CARDIOLOGY SUMMARY
Current Issues/Problems reviewed on call: Advanced directives    Details for Interventions/Education completed on call: Outreached patient for follow up. He states he is doing well and feeling good. He had his catheter replaced 2 weeks ago and it has been draining great.     Juvencio states he has turned in his paperwork for son Aramis to be listed as medical poa. He states he will be done with pt next week. Juvencio has no questions or concerns at this time. CM provided contact information for future issues.    Screening completed for  COVID -19 using the [facility] Symptom . Screening is negative           [de-identified] : 5 17 2024: Sinus Rhythm  -Negative T-waves    ABNORMAL 11 19 2021  Sinus  Rhythm  -  Nonspecific T-abnormality.   ABNORMAL    9 29 2021  Sinus  Rhythm   Nonspecific T-abnormality.   ABNORMAL   [de-identified] : Apr 2024:  Gaurang bass echo: LVEF 40-45% and also 68%. 2 different EF in the conclusion.    [de-identified] : oct 2021:  Normal coronary arteyreies.   right leg:  Right common iliac 70%.  righty CFa : 90%.  left leg : unremarkable .  [de-identified] : nov 2023: MPRESSION: Diffuse atherosclerotic disease of the bilateral lower extremities. High-grade stenoses of the right common iliac, common femoral and distal superficial femoral and popliteal arteries.  High-grade stenosis of the left popliteal artery.

## 2024-05-30 ENCOUNTER — APPOINTMENT (OUTPATIENT)
Dept: CARDIOLOGY | Facility: CLINIC | Age: 69
End: 2024-05-30

## 2024-06-03 ENCOUNTER — NON-APPOINTMENT (OUTPATIENT)
Age: 69
End: 2024-06-03

## 2024-06-05 ENCOUNTER — APPOINTMENT (OUTPATIENT)
Dept: CARDIOLOGY | Facility: CLINIC | Age: 69
End: 2024-06-05
Payer: MEDICARE

## 2024-06-05 DIAGNOSIS — I73.9 PERIPHERAL VASCULAR DISEASE, UNSPECIFIED: ICD-10-CM

## 2024-06-05 PROCEDURE — 93925 LOWER EXTREMITY STUDY: CPT

## 2024-06-05 PROCEDURE — 93923 UPR/LXTR ART STDY 3+ LVLS: CPT

## 2024-06-05 RX ORDER — CILOSTAZOL 50 MG/1
50 TABLET ORAL
Qty: 90 | Refills: 3 | Status: ACTIVE | COMMUNITY
Start: 2024-06-05 | End: 1900-01-01

## 2024-06-06 ENCOUNTER — NON-APPOINTMENT (OUTPATIENT)
Age: 69
End: 2024-06-06

## 2024-06-13 ENCOUNTER — APPOINTMENT (OUTPATIENT)
Dept: CARDIOLOGY | Facility: CLINIC | Age: 69
End: 2024-06-13

## 2024-06-27 ENCOUNTER — APPOINTMENT (OUTPATIENT)
Dept: CARDIOLOGY | Facility: CLINIC | Age: 69
End: 2024-06-27

## 2024-06-27 LAB
APO LP(A) SERPL-MCNC: 12 NMOL/L
CHOLEST SERPL-MCNC: 144 MG/DL
FOLATE SERPL-MCNC: 8.8 NG/ML
HDLC SERPL-MCNC: 42 MG/DL
LDLC SERPL CALC-MCNC: 66 MG/DL
NONHDLC SERPL-MCNC: 102 MG/DL
TRIGL SERPL-MCNC: 218 MG/DL
VIT B12 SERPL-MCNC: 836 PG/ML

## 2024-07-16 ENCOUNTER — TRANSCRIPTION ENCOUNTER (OUTPATIENT)
Age: 69
End: 2024-07-16

## 2024-07-18 ENCOUNTER — APPOINTMENT (OUTPATIENT)
Dept: CARDIOLOGY | Facility: CLINIC | Age: 69
End: 2024-07-18
Payer: MEDICARE

## 2024-07-18 PROCEDURE — 93978 VASCULAR STUDY: CPT

## 2024-08-20 ENCOUNTER — APPOINTMENT (OUTPATIENT)
Dept: PULMONOLOGY | Facility: CLINIC | Age: 69
End: 2024-08-20
Payer: MEDICARE

## 2024-08-20 VITALS — HEIGHT: 61 IN | BODY MASS INDEX: 24.17 KG/M2 | WEIGHT: 128 LBS

## 2024-08-20 VITALS
SYSTOLIC BLOOD PRESSURE: 128 MMHG | OXYGEN SATURATION: 94 % | DIASTOLIC BLOOD PRESSURE: 72 MMHG | RESPIRATION RATE: 16 BRPM | HEART RATE: 98 BPM

## 2024-08-20 DIAGNOSIS — J43.2 CENTRILOBULAR EMPHYSEMA: ICD-10-CM

## 2024-08-20 DIAGNOSIS — R06.02 SHORTNESS OF BREATH: ICD-10-CM

## 2024-08-20 DIAGNOSIS — G47.33 OBSTRUCTIVE SLEEP APNEA (ADULT) (PEDIATRIC): ICD-10-CM

## 2024-08-20 DIAGNOSIS — F17.200 NICOTINE DEPENDENCE, UNSPECIFIED, UNCOMPLICATED: ICD-10-CM

## 2024-08-20 DIAGNOSIS — J44.9 CHRONIC OBSTRUCTIVE PULMONARY DISEASE, UNSPECIFIED: ICD-10-CM

## 2024-08-20 DIAGNOSIS — J96.11 CHRONIC RESPIRATORY FAILURE WITH HYPOXIA: ICD-10-CM

## 2024-08-20 DIAGNOSIS — R93.89 ABNORMAL FINDINGS ON DIAGNOSTIC IMAGING OF OTHER SPECIFIED BODY STRUCTURES: ICD-10-CM

## 2024-08-20 DIAGNOSIS — F17.210 NICOTINE DEPENDENCE, CIGARETTES, UNCOMPLICATED: ICD-10-CM

## 2024-08-20 DIAGNOSIS — R91.8 OTHER NONSPECIFIC ABNORMAL FINDING OF LUNG FIELD: ICD-10-CM

## 2024-08-20 DIAGNOSIS — E66.3 OVERWEIGHT: ICD-10-CM

## 2024-08-20 DIAGNOSIS — R05.9 COUGH, UNSPECIFIED: ICD-10-CM

## 2024-08-20 PROCEDURE — 99214 OFFICE O/P EST MOD 30 MIN: CPT | Mod: 25

## 2024-08-20 PROCEDURE — 99406 BEHAV CHNG SMOKING 3-10 MIN: CPT

## 2024-08-20 PROCEDURE — G0296 VISIT TO DETERM LDCT ELIG: CPT

## 2024-08-20 PROCEDURE — 94729 DIFFUSING CAPACITY: CPT

## 2024-08-20 PROCEDURE — 94010 BREATHING CAPACITY TEST: CPT

## 2024-08-20 PROCEDURE — 85018 HEMOGLOBIN: CPT | Mod: QW

## 2024-08-20 PROCEDURE — 94727 GAS DIL/WSHOT DETER LNG VOL: CPT

## 2024-08-20 RX ORDER — DULAGLUTIDE 0.75 MG/.5ML
0.75 INJECTION, SOLUTION SUBCUTANEOUS
Refills: 0 | Status: ACTIVE | COMMUNITY

## 2024-08-20 RX ORDER — METHYLPREDNISOLONE 4 MG/1
4 TABLET ORAL
Qty: 1 | Refills: 1 | Status: ACTIVE | COMMUNITY
Start: 2024-08-20 | End: 1900-01-01

## 2024-08-20 NOTE — PROCEDURE
[FreeTextEntry1] : PFTs performed previously revealed moderate to severe COPD and was near baseline. Lung volumes were normal but diffusion capacity was moderately reduced though nearly corrected for alveolar volume. PFTs subsequently were essentially unchanged Spirometry 10/1/18 - severe COPD and worse than her previous PFTs 8/13/21 - Moderately reduced FVC with severely reduced FEV1 with obstruction with near normal lung volumes and mildly reduced DLCO which corrected for alveolar volume.  [place] 9/7/23 - Mildly reduced FVC with severely reduced FEV1 with obstructive pattern without change; near baseline. PFTs 8/20/24 - Normal FVC and moderately reduced FEV1 with obstruction but normal lung volumes and moderately reduced DLCO which improves when corrected for alveolar volume. Douglas slightly improved from prior.

## 2024-08-20 NOTE — RESULTS/DATA
[TextEntry] : Home PSG from 8/3/20 revealed mild CHINO with an AHI of 7.7 based on 3% desaturation but only 6.1 based on 4% desaturation. The patient's overall compliance was 27% with a >4hr compliance of 3% on autoCPAP with a median and max pressure of 4 and 5.8 cm of water, respectively with a residual AHI of 1 which was normal though now is not compliant. CXR from 4/24/23 was clear with COPD.

## 2024-08-20 NOTE — CONSULT LETTER
[Dear  ___] : Dear  [unfilled], [Consult Letter:] : I had the pleasure of evaluating your patient, [unfilled]. [Please see my note below.] : Please see my note below. [Consult Closing:] : Thank you very much for allowing me to participate in the care of this patient.  If you have any questions, please do not hesitate to contact me. [Sincerely,] : Sincerely, [FreeTextEntry3] : Curtis Hardy MD, FCCP, D. ABSM\par  Pulmonary and Sleep Medicine\par  [facility] [place]

## 2024-08-20 NOTE — DISCUSSION/SUMMARY
[FreeTextEntry1] : #1. Pt on Trelegy 200 for her moderate COPD instead of Symbicort and Spiriva. #2. Chest CT for lung cancer screening given smoking hx revealed two new nodules of 4 and 6 mm in size and now with repeat CT which revealed stable nodules and emphysema. Will resume annual lung cancer screening CT in 1/2025. #3. Stressed importance of smoking cessation; provided information on the [facility] program for smoking cessation in the past. #4. F/u in 5 months with chest CT. #5. Encouraged exercise. #6. Nasonex for PNDS. #7. Consider ENT evaluation. #8. Prednisone taper for wheeze as needed. #9. Encouraged compliance with f/u. #10. Pt had both Covid vaccines. #11. Encouraged compliance with APAP for mild CHINO with an AHI of 7.7 though currently is not compliant; she is willing to consider dental evaluation for an oral appliance as alternative therapy for treatment of CHINO as pt did not tolerate CPAP therapy but does not necessarily require therapy given mild degree of CHINO. Could consider repeat HST to re-evaluate for possible resolution of CHINO given mild degree of symptoms. #12. Continue home O2 at a level of 2 with portable concentrator (Inogen) and 2 lpm with sleep. #13. Counselled pt against smoking when on or near her O2 therapy. #14. Flonase nasal spray for postnasal drip as needed. Zyrtec/Allegra/Claritin for allergies as needed.  The patient expressed understanding and agreement with the above recommendations/plan and accepts responsibility to be compliant with recommended testing, therapies, and f/u visits. All relevant questions and concerns were addressed.

## 2024-08-20 NOTE — REVIEW OF SYSTEMS
[Postnasal Drip] : postnasal drip [Cough] : cough [SOB on Exertion] : sob on exertion [Depression] : depression [Diabetes] : diabetes [Negative] : Constitutional

## 2024-08-20 NOTE — HISTORY OF PRESENT ILLNESS
[Dyspnea on Exertion] : dyspnea on exertion [Low Interest] : low interest in quitting [Several Times] : tried to quit several times [Weight Control] : weight control [Stress Management] : stress management [Saving Money] : saving money [Improved Health] : improved health [Smoking Addiction] : smoking addiction [Nicotine Craving] : nicotine craving [Anxiety] : anxiety [Currently Experiencing] : The patient is currently experiencing symptoms. [Follow-Up - Routine Clinic] : a routine clinic follow-up of [None] : No associated symptoms are reported [Poor Compliance] : poor compliance with treatment [Poor Tolerance] : poor tolerance of treatment [Poor Symptom Control] : poor symptom control [CT Scan] : a CT scan [On ___] : performed on [unfilled] [Patient] : the patient [To Assess ___] : to assess [unfilled] [TextBox_4] : The patient was previously followed by our service during her hospitalization at [facility] when she was admitted for a COPD exacerbation. The patient's daughter passed away from cancer in 2013 and then her  passed in 7/2015. She had been somewhat tearful previously but appears to be doing better now.  Pt was last seen 8/2021 Pt with prior COPD exac for which she was started on prednisone taper and doxycylcine with improvement. CXR was clear.  [de-identified] : APAP [FreeTextEntry5] : chest CT [FreeTextEntry8] : stable nodules and emphysematous changes [TextEntry] : Chest CT from 8/31/13 reveals stable subcentimeter pulmonary nodules and emphysematous changes.  Last chest CT from 12/15/13 revealed stable changes.  Chest CT from 12/10/14 appears to reveal no new changes.  Chest CT from 12/1/15 is revealed stable changes. Chest CT from 12/8/16 revealed stable change  Chest CT from 5/2/18 revealed essentially stable change with emphysematous changes, mild scarring and 2 mm nodule. Chest CT from 11/16/20 revealed stable changes with two new nodules measuring 4 and 6 mm in size. Chest CT from 8/17/21 revealed emphysematous changes and stable nodules Chest CT from 5/17/23 revealed a LLL 1.2 x 0.6 cm nodule.  Chest CT from 8/18/23 revealed a stable LLL 1.2 x 0.6 cm nodule. Chest CT from 12/29/23 revealed stable nodules with emphysema.

## 2024-08-20 NOTE — COUNSELING
[Cessation strategies including cessation program discussed] : Cessation strategies including cessation program discussed [Use of nicotine replacement therapies and other medications discussed] : Use of nicotine replacement therapies and other medications discussed [Encouraged to pick a quit date and identify support needed to quit] : Encouraged to pick a quit date and identify support needed to quit [FreeTextEntry1] : 4 [ - Annual Lung Cancer Screening/Share Decision Making Discussion] : Annual Lung Cancer Screening/Share Decision Making Discussion. (I have advised this patient to have a Low Dose CT (LDCT) scan of the lungs and have discussed the following with the patient in a shared decision making discussion:   Benefits of Detection and Early Treatment: There is adequate evidence that annual screening for lung cancer with LDCT in a population of high-risk persons can prevent a substantial number of lung cancer-related deaths. The magnitude of benefit depends on the individual patient's risk for lung cancer, as those who are at highest risk are most likely to benefit. Screening cannot prevent most lung cancer-related deaths, and does not replace smoking cessation. Harms of Detection and Early Intervention and Treatment: The harms associated with LDCT screening include false-negative and false-positive results, incidental findings, over diagnosis, and radiation exposure. False-positive LDCT results occur in a substantial proportion of screened persons; 95% of all positive results do not lead to a diagnosis of cancer. In a high-quality screening program, further imaging can resolve most false-positive results; however, some patients may require invasive procedures. Radiation harms, including cancer resulting from cumulative exposure to radiation, vary depending on the age at the start of screening; the number of scans received; and the person's exposure to other sources of radiation, particularly other medical imaging.)

## 2024-10-14 ENCOUNTER — OFFICE (OUTPATIENT)
Dept: URBAN - METROPOLITAN AREA CLINIC 114 | Facility: CLINIC | Age: 69
Setting detail: OPHTHALMOLOGY
End: 2024-10-14
Payer: MEDICARE

## 2024-10-14 DIAGNOSIS — E11.3291: ICD-10-CM

## 2024-10-14 DIAGNOSIS — E11.3292: ICD-10-CM

## 2024-10-14 DIAGNOSIS — H04.123: ICD-10-CM

## 2024-10-14 DIAGNOSIS — H01.001: ICD-10-CM

## 2024-10-14 DIAGNOSIS — H01.004: ICD-10-CM

## 2024-10-14 PROCEDURE — 92250 FUNDUS PHOTOGRAPHY W/I&R: CPT | Performed by: OPHTHALMOLOGY

## 2024-10-14 PROCEDURE — 99214 OFFICE O/P EST MOD 30 MIN: CPT | Performed by: OPHTHALMOLOGY

## 2024-10-14 ASSESSMENT — TONOMETRY
OD_IOP_MMHG: 15
OS_IOP_MMHG: 15

## 2024-10-14 ASSESSMENT — LID EXAM ASSESSMENTS
OD_BLEPHARITIS: RUL 1+
OS_BLEPHARITIS: LUL 1+

## 2024-10-14 ASSESSMENT — CONFRONTATIONAL VISUAL FIELD TEST (CVF)
OS_FINDINGS: FULL
OD_FINDINGS: FULL

## 2024-10-14 ASSESSMENT — LID POSITION - ECTROPION
OS_ECTROPION: LLL T
OD_ECTROPION: RLL T

## 2024-10-15 ASSESSMENT — KERATOMETRY
OD_K2POWER_DIOPTERS: 45.75
OD_AXISANGLE_DEGREES: 084
OS_AXISANGLE_DEGREES: 001
OD_K1POWER_DIOPTERS: 45.00
OS_K2POWER_DIOPTERS: 46.00
OS_K1POWER_DIOPTERS: 45.50
METHOD_AUTO_MANUAL: AUTO

## 2024-10-15 ASSESSMENT — VISUAL ACUITY
OD_BCVA: 20/20+1
OS_BCVA: 20/20+1

## 2024-10-15 ASSESSMENT — REFRACTION_AUTOREFRACTION
OS_AXIS: 079
OD_SPHERE: -0.50
OS_SPHERE: +0.25
OD_AXIS: 066
OS_CYLINDER: -0.75
OD_CYLINDER: -0.25

## 2024-12-03 ENCOUNTER — APPOINTMENT (OUTPATIENT)
Dept: CARDIOLOGY | Facility: CLINIC | Age: 69
End: 2024-12-03

## 2024-12-16 ENCOUNTER — NON-APPOINTMENT (OUTPATIENT)
Age: 69
End: 2024-12-16

## 2024-12-16 VITALS — BODY MASS INDEX: 24.17 KG/M2 | WEIGHT: 128 LBS | HEIGHT: 61 IN

## 2024-12-16 DIAGNOSIS — F17.210 NICOTINE DEPENDENCE, CIGARETTES, UNCOMPLICATED: ICD-10-CM

## 2024-12-30 ENCOUNTER — APPOINTMENT (OUTPATIENT)
Dept: CT IMAGING | Facility: CLINIC | Age: 69
End: 2024-12-30

## 2025-01-13 ENCOUNTER — OUTPATIENT (OUTPATIENT)
Dept: OUTPATIENT SERVICES | Facility: HOSPITAL | Age: 70
LOS: 1 days | End: 2025-01-13
Payer: MEDICARE

## 2025-01-13 ENCOUNTER — APPOINTMENT (OUTPATIENT)
Dept: CT IMAGING | Facility: CLINIC | Age: 70
End: 2025-01-13
Payer: MEDICARE

## 2025-01-13 DIAGNOSIS — F17.200 NICOTINE DEPENDENCE, UNSPECIFIED, UNCOMPLICATED: ICD-10-CM

## 2025-01-13 PROCEDURE — 71271 CT THORAX LUNG CANCER SCR C-: CPT

## 2025-01-13 PROCEDURE — 71271 CT THORAX LUNG CANCER SCR C-: CPT | Mod: 26

## 2025-02-06 RX ORDER — AZITHROMYCIN 250 MG/1
250 TABLET, FILM COATED ORAL
Qty: 1 | Refills: 1 | Status: ACTIVE | COMMUNITY
Start: 2025-02-06 | End: 1900-01-01

## 2025-02-06 RX ORDER — METHYLPREDNISOLONE 4 MG/1
4 TABLET ORAL
Qty: 1 | Refills: 1 | Status: ACTIVE | COMMUNITY
Start: 2025-02-06 | End: 1900-01-01

## 2025-02-20 ENCOUNTER — APPOINTMENT (OUTPATIENT)
Dept: NUCLEAR MEDICINE | Facility: CLINIC | Age: 70
End: 2025-02-20

## 2025-02-20 PROCEDURE — 78815 PET IMAGE W/CT SKULL-THIGH: CPT | Mod: 26,PI

## 2025-02-25 ENCOUNTER — APPOINTMENT (OUTPATIENT)
Dept: PULMONOLOGY | Facility: CLINIC | Age: 70
End: 2025-02-25
Payer: MEDICARE

## 2025-02-25 VITALS
BODY MASS INDEX: 21.14 KG/M2 | HEIGHT: 61 IN | DIASTOLIC BLOOD PRESSURE: 72 MMHG | HEART RATE: 98 BPM | OXYGEN SATURATION: 94 % | RESPIRATION RATE: 16 BRPM | SYSTOLIC BLOOD PRESSURE: 125 MMHG | WEIGHT: 112 LBS

## 2025-02-25 DIAGNOSIS — R06.02 SHORTNESS OF BREATH: ICD-10-CM

## 2025-02-25 DIAGNOSIS — J96.11 CHRONIC RESPIRATORY FAILURE WITH HYPOXIA: ICD-10-CM

## 2025-02-25 DIAGNOSIS — J44.9 CHRONIC OBSTRUCTIVE PULMONARY DISEASE, UNSPECIFIED: ICD-10-CM

## 2025-02-25 DIAGNOSIS — F17.200 NICOTINE DEPENDENCE, UNSPECIFIED, UNCOMPLICATED: ICD-10-CM

## 2025-02-25 DIAGNOSIS — J43.2 CENTRILOBULAR EMPHYSEMA: ICD-10-CM

## 2025-02-25 DIAGNOSIS — R93.89 ABNORMAL FINDINGS ON DIAGNOSTIC IMAGING OF OTHER SPECIFIED BODY STRUCTURES: ICD-10-CM

## 2025-02-25 DIAGNOSIS — R05.9 COUGH, UNSPECIFIED: ICD-10-CM

## 2025-02-25 DIAGNOSIS — G47.33 OBSTRUCTIVE SLEEP APNEA (ADULT) (PEDIATRIC): ICD-10-CM

## 2025-02-25 DIAGNOSIS — E66.3 OVERWEIGHT: ICD-10-CM

## 2025-02-25 PROCEDURE — 99215 OFFICE O/P EST HI 40 MIN: CPT

## 2025-02-25 PROCEDURE — G2211 COMPLEX E/M VISIT ADD ON: CPT

## 2025-03-03 ENCOUNTER — APPOINTMENT (OUTPATIENT)
Dept: THORACIC SURGERY | Facility: CLINIC | Age: 70
End: 2025-03-03
Payer: MEDICARE

## 2025-03-10 ENCOUNTER — APPOINTMENT (OUTPATIENT)
Dept: THORACIC SURGERY | Facility: CLINIC | Age: 70
End: 2025-03-10
Payer: MEDICARE

## 2025-03-10 VITALS
DIASTOLIC BLOOD PRESSURE: 76 MMHG | TEMPERATURE: 98 F | HEIGHT: 61 IN | HEART RATE: 107 BPM | OXYGEN SATURATION: 94 % | SYSTOLIC BLOOD PRESSURE: 119 MMHG | BODY MASS INDEX: 21.14 KG/M2 | RESPIRATION RATE: 16 BRPM | WEIGHT: 112 LBS

## 2025-03-10 DIAGNOSIS — F17.200 NICOTINE DEPENDENCE, UNSPECIFIED, UNCOMPLICATED: ICD-10-CM

## 2025-03-10 DIAGNOSIS — F17.210 NICOTINE DEPENDENCE, CIGARETTES, UNCOMPLICATED: ICD-10-CM

## 2025-03-10 DIAGNOSIS — R91.8 OTHER NONSPECIFIC ABNORMAL FINDING OF LUNG FIELD: ICD-10-CM

## 2025-03-10 DIAGNOSIS — Z87.19 PERSONAL HISTORY OF OTHER DISEASES OF THE DIGESTIVE SYSTEM: ICD-10-CM

## 2025-03-10 PROCEDURE — 99204 OFFICE O/P NEW MOD 45 MIN: CPT

## 2025-03-22 ENCOUNTER — OUTPATIENT (OUTPATIENT)
Dept: OUTPATIENT SERVICES | Facility: HOSPITAL | Age: 70
LOS: 1 days | End: 2025-03-22
Payer: MEDICARE

## 2025-03-22 VITALS
HEIGHT: 61 IN | RESPIRATION RATE: 18 BRPM | HEART RATE: 90 BPM | OXYGEN SATURATION: 93 % | SYSTOLIC BLOOD PRESSURE: 124 MMHG | TEMPERATURE: 97 F | WEIGHT: 113.98 LBS | DIASTOLIC BLOOD PRESSURE: 76 MMHG

## 2025-03-22 DIAGNOSIS — R91.1 SOLITARY PULMONARY NODULE: ICD-10-CM

## 2025-03-22 DIAGNOSIS — Z29.9 ENCOUNTER FOR PROPHYLACTIC MEASURES, UNSPECIFIED: ICD-10-CM

## 2025-03-22 DIAGNOSIS — Z98.890 OTHER SPECIFIED POSTPROCEDURAL STATES: Chronic | ICD-10-CM

## 2025-03-22 DIAGNOSIS — J44.9 CHRONIC OBSTRUCTIVE PULMONARY DISEASE, UNSPECIFIED: ICD-10-CM

## 2025-03-22 DIAGNOSIS — G47.33 OBSTRUCTIVE SLEEP APNEA (ADULT) (PEDIATRIC): ICD-10-CM

## 2025-03-22 DIAGNOSIS — I73.9 PERIPHERAL VASCULAR DISEASE, UNSPECIFIED: ICD-10-CM

## 2025-03-22 DIAGNOSIS — Z01.818 ENCOUNTER FOR OTHER PREPROCEDURAL EXAMINATION: ICD-10-CM

## 2025-03-22 DIAGNOSIS — E11.9 TYPE 2 DIABETES MELLITUS WITHOUT COMPLICATIONS: ICD-10-CM

## 2025-03-22 DIAGNOSIS — I10 ESSENTIAL (PRIMARY) HYPERTENSION: ICD-10-CM

## 2025-03-22 LAB
A1C WITH ESTIMATED AVERAGE GLUCOSE RESULT: 7.6 % — HIGH (ref 4–5.6)
ALBUMIN SERPL ELPH-MCNC: 4.4 G/DL — SIGNIFICANT CHANGE UP (ref 3.3–5.2)
ALT FLD-CCNC: 19 U/L — SIGNIFICANT CHANGE UP
ANION GAP SERPL CALC-SCNC: 13 MMOL/L — SIGNIFICANT CHANGE UP (ref 5–17)
APTT BLD: 31.3 SEC — SIGNIFICANT CHANGE UP (ref 24.5–35.6)
AST SERPL-CCNC: 15 U/L — SIGNIFICANT CHANGE UP
BASOPHILS # BLD AUTO: 0.04 K/UL — SIGNIFICANT CHANGE UP (ref 0–0.2)
BASOPHILS NFR BLD AUTO: 0.5 % — SIGNIFICANT CHANGE UP (ref 0–2)
BILIRUB SERPL-MCNC: <0.2 MG/DL — LOW (ref 0.4–2)
BUN SERPL-MCNC: 15.6 MG/DL — SIGNIFICANT CHANGE UP (ref 8–20)
CALCIUM SERPL-MCNC: 9.3 MG/DL — SIGNIFICANT CHANGE UP (ref 8.4–10.5)
CHLORIDE SERPL-SCNC: 99 MMOL/L — SIGNIFICANT CHANGE UP (ref 96–108)
CO2 SERPL-SCNC: 25 MMOL/L — SIGNIFICANT CHANGE UP (ref 22–29)
CREAT SERPL-MCNC: 0.76 MG/DL — SIGNIFICANT CHANGE UP (ref 0.5–1.3)
EGFR: 84 ML/MIN/1.73M2 — SIGNIFICANT CHANGE UP
EOSINOPHIL # BLD AUTO: 0.23 K/UL — SIGNIFICANT CHANGE UP (ref 0–0.5)
EOSINOPHIL NFR BLD AUTO: 2.8 % — SIGNIFICANT CHANGE UP (ref 0–6)
ESTIMATED AVERAGE GLUCOSE: 171 MG/DL — HIGH (ref 68–114)
GLUCOSE SERPL-MCNC: 180 MG/DL — HIGH (ref 70–99)
HCT VFR BLD CALC: 46.8 % — HIGH (ref 34.5–45)
HGB BLD-MCNC: 15.6 G/DL — HIGH (ref 11.5–15.5)
IMM GRANULOCYTES # BLD AUTO: 0.03 K/UL — SIGNIFICANT CHANGE UP (ref 0–0.07)
IMM GRANULOCYTES NFR BLD AUTO: 0.4 % — SIGNIFICANT CHANGE UP (ref 0–0.9)
INR BLD: 0.96 RATIO — SIGNIFICANT CHANGE UP (ref 0.85–1.16)
LYMPHOCYTES # BLD AUTO: 2.01 K/UL — SIGNIFICANT CHANGE UP (ref 1–3.3)
LYMPHOCYTES NFR BLD AUTO: 24.8 % — SIGNIFICANT CHANGE UP (ref 13–44)
MCHC RBC-ENTMCNC: 31 PG — SIGNIFICANT CHANGE UP (ref 27–34)
MCHC RBC-ENTMCNC: 33.3 G/DL — SIGNIFICANT CHANGE UP (ref 32–36)
MCV RBC AUTO: 92.9 FL — SIGNIFICANT CHANGE UP (ref 80–100)
MONOCYTES # BLD AUTO: 0.51 K/UL — SIGNIFICANT CHANGE UP (ref 0–0.9)
MONOCYTES NFR BLD AUTO: 6.3 % — SIGNIFICANT CHANGE UP (ref 2–14)
NEUTROPHILS # BLD AUTO: 5.29 K/UL — SIGNIFICANT CHANGE UP (ref 1.8–7.4)
NEUTROPHILS NFR BLD AUTO: 65.2 % — SIGNIFICANT CHANGE UP (ref 43–77)
NRBC # BLD AUTO: 0 K/UL — SIGNIFICANT CHANGE UP (ref 0–0)
NRBC # FLD: 0 K/UL — SIGNIFICANT CHANGE UP (ref 0–0)
NRBC BLD AUTO-RTO: 0 /100 WBCS — SIGNIFICANT CHANGE UP (ref 0–0)
PLATELET # BLD AUTO: 286 K/UL — SIGNIFICANT CHANGE UP (ref 150–400)
PMV BLD: 11.5 FL — SIGNIFICANT CHANGE UP (ref 7–13)
POTASSIUM SERPL-MCNC: 4.8 MMOL/L — SIGNIFICANT CHANGE UP (ref 3.5–5.3)
POTASSIUM SERPL-SCNC: 4.8 MMOL/L — SIGNIFICANT CHANGE UP (ref 3.5–5.3)
PROTHROM AB SERPL-ACNC: 10.8 SEC — SIGNIFICANT CHANGE UP (ref 9.9–13.4)
RBC # BLD: 5.04 M/UL — SIGNIFICANT CHANGE UP (ref 3.8–5.2)
RBC # FLD: 14.3 % — SIGNIFICANT CHANGE UP (ref 10.3–14.5)
SODIUM SERPL-SCNC: 137 MMOL/L — SIGNIFICANT CHANGE UP (ref 135–145)
WBC # FLD AUTO: 8.11 K/UL — SIGNIFICANT CHANGE UP (ref 3.8–10.5)

## 2025-03-22 PROCEDURE — 36415 COLL VENOUS BLD VENIPUNCTURE: CPT

## 2025-03-22 PROCEDURE — G0463: CPT

## 2025-03-22 PROCEDURE — 80053 COMPREHEN METABOLIC PANEL: CPT

## 2025-03-22 PROCEDURE — 85610 PROTHROMBIN TIME: CPT

## 2025-03-22 PROCEDURE — 85730 THROMBOPLASTIN TIME PARTIAL: CPT

## 2025-03-22 PROCEDURE — 83036 HEMOGLOBIN GLYCOSYLATED A1C: CPT

## 2025-03-22 PROCEDURE — 85025 COMPLETE CBC W/AUTO DIFF WBC: CPT

## 2025-03-22 RX ORDER — CYANOCOBALAMIN 1000 UG/ML
0 INJECTION INTRAMUSCULAR; SUBCUTANEOUS
Refills: 0 | DISCHARGE

## 2025-03-22 RX ORDER — DULAGLUTIDE 4.5 MG/.5ML
3 INJECTION, SOLUTION SUBCUTANEOUS
Refills: 0 | DISCHARGE

## 2025-03-22 RX ORDER — CILOSTAZOL 50 MG/1
1 TABLET ORAL
Refills: 0 | DISCHARGE

## 2025-03-22 RX ORDER — DAPAGLIFLOZIN 5 MG/1
1 TABLET, FILM COATED ORAL
Refills: 0 | DISCHARGE

## 2025-03-22 NOTE — H&P PST ADULT - PROBLEM SELECTOR PLAN 1
Patient was seen today with lung nodule for scheduled robotic bronch biopsy with Dr. Lemon on 3/26/25.       -Pending medical and cardiac clearances

## 2025-03-22 NOTE — H&P PST ADULT - HISTORY OF PRESENT ILLNESS
1/13/25 CT Lung Cancer Screening through [facility]  - New irregular 1 cm and 1.2 cm right upper lobe nodules.  - Stable 1.2 cm juxta fissural nodule within superior segment of left lower lobe.  - Stable 0.5 cm juxtapleural nodule within the left lower lobe.  ?  2/20/25 PET Scan through [facility]  -New RIGHT lung nodules on recent CT exhibit increased uptake. Differential diagnoses include infection versus neoplasm.  -A ground glass lesion in the LOWER lobe is new since most recent CT. Findings in the LEFT lung appear grossly stable.  -Suspected focus of abnormal uptake in the rectosigmoid.       Megha pearl is a 69 year old female with PMH significant for    PPD 1 x 52 years    Reports going for annual CT chest, sent by pulmonologist, that revealed 2 nodules  PET scan confirmed findings  HERNANDEZ, chronic cough        1/13/25 CT Lung Cancer Screening through [facility]  - New irregular 1 cm and 1.2 cm right upper lobe nodules.  - Stable 1.2 cm juxta fissural nodule within superior segment of left lower lobe.  - Stable 0.5 cm juxtapleural nodule within the left lower lobe.  ?  2/20/25 PET Scan through [facility]  -New RIGHT lung nodules on recent CT exhibit increased uptake. Differential diagnoses include infection versus neoplasm.  -A ground glass lesion in the LOWER lobe is new since most recent CT. Findings in the LEFT lung appear grossly stable.  -Suspected focus of abnormal uptake in the rectosigmoid. Megha Ferraro is a 69 year old female with PMH significant for COPD (prn home O2, 2L), current everyday cigarette smoker, HTN, DM2, HLD, PAD, and IBS. Patient follows with Dr. Hardy (pulmonologist) and reports going for her routine annual chest CT scan that revealed 2 lungs nodules that was later confirmed with a PET scan. Patient admits to HERNANDEZ, fatigue, and chronic productive cough with thick clear sputum. Admits to occasional use of home oxygen at night. Denies fever, chills, CP, palpitations, dizziness, or headaches. Patient was seen today with lung nodule for scheduled robotic bronch biopsy with Dr. Lemon on 3/26/25.       1/13/25 CT Lung Cancer Screening through [facility]  - New irregular 1 cm and 1.2 cm right upper lobe nodules.  - Stable 1.2 cm juxta fissural nodule within superior segment of left lower lobe.  - Stable 0.5 cm juxtapleural nodule within the left lower lobe.  ?  2/20/25 PET Scan through [facility]  -New RIGHT lung nodules on recent CT exhibit increased uptake. Differential diagnoses include infection versus neoplasm.  -A ground glass lesion in the LOWER lobe is new since most recent CT. Findings in the LEFT lung appear grossly stable.  -Suspected focus of abnormal uptake in the rectosigmoid. Megha Ferraro is a 69 year old female with PMH significant for COPD (prn home O2, 2L), current everyday cigarette smoker, HTN, DM2, HLD, PAD, and IBS. Patient follows with Dr. Hardy (pulmonologist) and reports going for her routine annual chest CT scan that revealed 2 lung nodules that was later confirmed with a PET scan. Patient admits to HERNANDEZ, fatigue, and chronic productive cough with thick clear sputum. Admits to occasional use of home oxygen at night. Denies fever, chills, CP, palpitations, dizziness, or headaches. Patient was seen today with lung nodule for scheduled robotic bronch biopsy with Dr. Lemon on 3/26/25.       1/13/25 CT Lung Cancer Screening through [facility]  - New irregular 1 cm and 1.2 cm right upper lobe nodules.  - Stable 1.2 cm juxta fissural nodule within superior segment of left lower lobe.  - Stable 0.5 cm juxtapleural nodule within the left lower lobe.  ?  2/20/25 PET Scan through [facility]  -New RIGHT lung nodules on recent CT exhibit increased uptake. Differential diagnoses include infection versus neoplasm.  -A ground glass lesion in the LOWER lobe is new since most recent CT. Findings in the LEFT lung appear grossly stable.  -Suspected focus of abnormal uptake in the rectosigmoid.

## 2025-03-22 NOTE — H&P PST ADULT - PROBLEM SELECTOR PLAN 2
-Instructed patient to take aspirin and lisinopril the morning of surgery with a sip of water  -Follow up with cardiologist for routine management

## 2025-03-22 NOTE — H&P PST ADULT - PROBLEM SELECTOR PLAN 3
-Instructed to use inhalers morning of surgery  -Follow up with pulmonologist for routine management

## 2025-03-22 NOTE — H&P PST ADULT - PROBLEM SELECTOR PLAN 4
-As per Dr. Lemon patient instructed to continue taking aspirin and stop taking Cilostazol 2 days prior to surgery  -Follow up with cardiology for routine management

## 2025-03-22 NOTE — H&P PST ADULT - ENDOCRINE
Labs received from [facility]:    Hepatitis B surface antibody QL nonreactive    Hepatitis B Core AB total nonreactive    Per Dr Ivan, Pt is not immune, if needing Hep B vaccine he will need to get this completed.    Pt lives in [place], no data in [place].     negative

## 2025-03-22 NOTE — H&P PST ADULT - PROBLEM SELECTOR PLAN 7
-Patient instructed to hold dose of metformin 24 hours prior to surgery, hold routine nightly dose the night before surgery and morning dose the day of surgery  -Patient instructed to hold Glimepiride 24 hours prior to surgery  -Patient instructed to stop taking Farxiga 3 days prior to surgery  -Instructed patient to hold Trulicity 1 week prior to surgery  -FS ordered on admission  -Follow up with PCP for routine management

## 2025-03-22 NOTE — H&P PST ADULT - ASSESSMENT
Megha Ferraro is a 69 year old female with PMH significant for COPD (prn home O2, 2L), current everyday cigarette smoker, HTN, DM2, HLD, PAD, and IBS. Patient follows with Dr. Hardy (pulmonologist) and reports going for her routine annual chest CT scan that revealed 2 lung nodules that was later confirmed with a PET scan. Patient admits to HERNANDEZ, fatigue, and chronic productive cough with thick clear sputum. Admits to occasional use of home oxygen at night. Denies fever, chills, CP, palpitations, dizziness, or headaches. Patient was seen today with lung nodule for scheduled robotic bronch biopsy with Dr. Lemon on 3/26/25.       -Medical and cardiac evaluation pending  -Patient educated on NPO after midnight  -Patient instructed to hold dose of metformin 24 hours prior to surgery, hold routine nightly dose the night before surgery and morning dose the day of surgery  -Patient instructed to hold Glimepiride 24 hours prior to surgery  -Patient instructed to stop taking Farxiga 3 days prior to surgery  -Instructed patient to hold Trulicity 1 week prior to surgery  -As per Dr. Lemon patient instructed to continue taking aspirin and stop taking Cilostazol 2 days prior to surgery  -Instructed patient to use inhalers the morning of surgery  -Instructed patient to take aspirin and lisinopril the morning of surgery with a sip of water  -Educated on NSAIDS, multivitamins and herbals that increase the risk of bleeding and need to be stopped 5 days before procedure  -Educated on infection prevention  -Tylenol can be taken if needed for pain  -Will continue all other medications as prescribed  -Verbalized understanding of all instructions.        CAPRINI SCORE    AGE RELATED RISK FACTORS                                                             [ ] Age 41-60 years                                            (1 Point)  [x ] Age: 61-74 years                                           (2 Points)                 [ ] Age= 75 years                                                (3 Points)             DISEASE RELATED RISK FACTORS                                                       [ ] Edema in the lower extremities                 (1 Point)                     [ ] Varicose veins                                               (1 Point)                                 [ ] BMI > 25 Kg/m2                                            (1 Point)                                  [ ] Serious infection (ie PNA)                            (1 Point)                     [x ] Lung disease ( COPD, Emphysema)            (1 Point)                                                                          [ ] Acute myocardial infarction                         (1 Point)                  [ ] Congestive heart failure (in the previous month)  (1 Point)         [ ] Inflammatory bowel disease                            (1 Point)                  [ ] Central venous access, PICC or Port               (2 points)       (within the last month)                                                                [ ] Stroke (in the previous month)                        (5 Points)    [ ] Previous or present malignancy                       (2 points)                                                                                                                                                         HEMATOLOGY RELATED FACTORS                                                         [ ] Prior episodes of VTE                                     (3 Points)                     [ ] Positive family history for VTE                      (3 Points)                  [ ] Prothrombin 24951 A                                     (3 Points)                     [ ] Factor V Leiden                                                (3 Points)                        [ ] Lupus anticoagulants                                      (3 Points)                                                           [ ] Anticardiolipin antibodies                              (3 Points)                                                       [ ] High homocysteine in the blood                   (3 Points)                                             [ ] Other congenital or acquired thrombophilia      (3 Points)                                                [ ] Heparin induced thrombocytopenia                  (3 Points)                                        MOBILITY RELATED FACTORS  [ ] Bed rest                                                         (1 Point)  [ ] Plaster cast                                                    (2 points)  [ ] Bed bound for more than 72 hours           (2 Points)    GENDER SPECIFIC FACTORS  [ ] Pregnancy or had a baby within the last month   (1 Point)  [ ] Post-partum < 6 weeks                                   (1 Point)  [ ] Hormonal therapy  or oral contraception   (1 Point)  [ ] History of pregnancy complications              (1 point)  [ ] Unexplained or recurrent              (1 Point)    OTHER RISK FACTORS                                           (1 Point)  [x ] BMI >40, smoking, diabetes requiring insulin, chemotherapy  blood transfusions and length of surgery over 2 hours    SURGERY RELATED RISK FACTORS  [ ]  Section within the last month     (1 Point)  [ x] Minor surgery                                                  (1 Point)  [ ] Arthroscopic surgery                                       (2 Points)  [ ] Planned major surgery lasting more            (2 Points)      than 45 minutes     [ ] Elective hip or knee joint replacement       (5 points)       surgery                                                TRAUMA RELATED RISK FACTORS  [ ] Fracture of the hip, pelvis, or leg                       (5 Points)  [ ] Spinal cord injury resulting in paralysis             (5 points)       (in the previous month)    [ ] Paralysis  (less than 1 month)                             (5 Points)  [ ] Multiple Trauma within 1 month                        (5 Points)    Total Score [  5 ]    Caprini Score 0-2: Low Risk, NO VTE prophylaxis required for most patients, encourage ambulation  Caprini Score 3-6: Moderate Risk , pharmacologic VTE prophylaxis is indicated for most patients (in the absence of contraindications)  Caprini Score Greater than or =7: High risk, pharmocologic VTE prophylaxis indicated for most patients (in the absence of contraindications)    OPIOID RISK TOOL    AMNIA EACH BOX THAT APPLIES AND ADD TOTALS AT THE END    FAMILY HISTORY OF SUBSTANCE ABUSE                 FEMALE         MALE                                                Alcohol                             [  ]1 pt          [  ]3pts                                               Illegal Durgs                     [  ]2 pts        [  ]3pts                                               Rx Drugs                           [  ]4 pts        [  ]4 pts    PERSONAL HISTORY OF SUBSTANCE ABUSE                                                                                          Alcohol                             [  ]3 pts       [  ]3 pts                                               Illegal Drugs                     [  ]4 pts        [  ]4 pts                                               Rx Drugs                           [  ]5 pts        [  ]5 pts    AGE BETWEEN 16-45 YEARS                                      [  ]1 pt         [  ]1 pt    HISTORY OF PREADOLESCENT   SEXUAL ABUSE                                                             [  ]3 pts        [  ]0pts    PSYCHOLOGICAL DISEASE                     ADD, OCD, Bipolar, Schizophrenia        [  ]2 pts         [  ]2 pts                      Depression                                               [  ]1 pt           [  ]1 pt           SCORING TOTAL   (add numbers and type here)              (0)                                     A score of 3 or lower indicated LOW risk for future opioid abuse  A score of 4 to 7 indicated moderate risk for future opioid abuse  A score of 8 or higher indicates a high risk for opioid abuse

## 2025-03-22 NOTE — H&P PST ADULT - NSICDXPASTSURGICALHX_GEN_ALL_CORE_FT
PAST SURGICAL HISTORY:  No significant past surgical history      PAST SURGICAL HISTORY:  H/O coronary angiogram     S/P bunionectomy

## 2025-03-25 ENCOUNTER — APPOINTMENT (OUTPATIENT)
Dept: CARDIOLOGY | Facility: CLINIC | Age: 70
End: 2025-03-25
Payer: MEDICARE

## 2025-03-25 ENCOUNTER — APPOINTMENT (OUTPATIENT)
Dept: CARDIOLOGY | Facility: CLINIC | Age: 70
End: 2025-03-25

## 2025-03-25 VITALS
HEART RATE: 86 BPM | SYSTOLIC BLOOD PRESSURE: 120 MMHG | BODY MASS INDEX: 21.9 KG/M2 | HEIGHT: 61 IN | DIASTOLIC BLOOD PRESSURE: 74 MMHG | WEIGHT: 116 LBS | OXYGEN SATURATION: 94 %

## 2025-03-25 DIAGNOSIS — I73.9 PERIPHERAL VASCULAR DISEASE, UNSPECIFIED: ICD-10-CM

## 2025-03-25 DIAGNOSIS — R06.02 SHORTNESS OF BREATH: ICD-10-CM

## 2025-03-25 DIAGNOSIS — R93.1 ABNORMAL FINDINGS ON DIAGNOSTIC IMAGING OF HEART AND CORONARY CIRCULATION: ICD-10-CM

## 2025-03-25 DIAGNOSIS — Z00.00 ENCOUNTER FOR GENERAL ADULT MEDICAL EXAMINATION W/OUT ABNORMAL FINDINGS: ICD-10-CM

## 2025-03-25 DIAGNOSIS — I10 ESSENTIAL (PRIMARY) HYPERTENSION: ICD-10-CM

## 2025-03-25 DIAGNOSIS — E78.5 HYPERLIPIDEMIA, UNSPECIFIED: ICD-10-CM

## 2025-03-25 DIAGNOSIS — I70.8 ATHEROSCLEROSIS OF OTHER ARTERIES: ICD-10-CM

## 2025-03-25 DIAGNOSIS — F17.210 NICOTINE DEPENDENCE, CIGARETTES, UNCOMPLICATED: ICD-10-CM

## 2025-03-25 DIAGNOSIS — Z01.810 ENCOUNTER FOR PREPROCEDURAL CARDIOVASCULAR EXAMINATION: ICD-10-CM

## 2025-03-25 DIAGNOSIS — G47.33 OBSTRUCTIVE SLEEP APNEA (ADULT) (PEDIATRIC): ICD-10-CM

## 2025-03-25 PROCEDURE — G2211 COMPLEX E/M VISIT ADD ON: CPT

## 2025-03-25 PROCEDURE — 99215 OFFICE O/P EST HI 40 MIN: CPT

## 2025-03-25 PROCEDURE — 78452 HT MUSCLE IMAGE SPECT MULT: CPT

## 2025-03-25 PROCEDURE — A9502: CPT

## 2025-03-25 PROCEDURE — 93000 ELECTROCARDIOGRAM COMPLETE: CPT

## 2025-03-25 PROCEDURE — 93015 CV STRESS TEST SUPVJ I&R: CPT

## 2025-03-26 ENCOUNTER — OUTPATIENT (OUTPATIENT)
Dept: OUTPATIENT SERVICES | Facility: HOSPITAL | Age: 70
LOS: 1 days | End: 2025-03-26
Payer: MEDICARE

## 2025-03-26 ENCOUNTER — RESULT REVIEW (OUTPATIENT)
Age: 70
End: 2025-03-26

## 2025-03-26 ENCOUNTER — APPOINTMENT (OUTPATIENT)
Dept: THORACIC SURGERY | Facility: HOSPITAL | Age: 70
End: 2025-03-26

## 2025-03-26 ENCOUNTER — TRANSCRIPTION ENCOUNTER (OUTPATIENT)
Age: 70
End: 2025-03-26

## 2025-03-26 VITALS
HEART RATE: 90 BPM | DIASTOLIC BLOOD PRESSURE: 69 MMHG | SYSTOLIC BLOOD PRESSURE: 111 MMHG | OXYGEN SATURATION: 94 % | RESPIRATION RATE: 23 BRPM

## 2025-03-26 VITALS
HEIGHT: 61 IN | WEIGHT: 111.99 LBS | TEMPERATURE: 98 F | DIASTOLIC BLOOD PRESSURE: 91 MMHG | SYSTOLIC BLOOD PRESSURE: 156 MMHG | RESPIRATION RATE: 19 BRPM | HEART RATE: 88 BPM | OXYGEN SATURATION: 95 %

## 2025-03-26 DIAGNOSIS — Z98.890 OTHER SPECIFIED POSTPROCEDURAL STATES: Chronic | ICD-10-CM

## 2025-03-26 DIAGNOSIS — R91.1 SOLITARY PULMONARY NODULE: ICD-10-CM

## 2025-03-26 LAB
B PERT IGG+IGM PNL SER: ABNORMAL
BASOPHILS %.: 2 % — SIGNIFICANT CHANGE UP
COLOR FLD: SIGNIFICANT CHANGE UP
EOSINOPHIL # FLD: 6 % — SIGNIFICANT CHANGE UP
GLUCOSE BLDC GLUCOMTR-MCNC: 181 MG/DL — HIGH (ref 70–99)
LYMPHOCYTES # FLD: 28 % — SIGNIFICANT CHANGE UP
MONOS+MACROS # FLD: 20 % — SIGNIFICANT CHANGE UP
NEUTROPHILS-BODY FLUID: 44 % — SIGNIFICANT CHANGE UP
OTHER CELLS FLD MANUAL: 42 % — SIGNIFICANT CHANGE UP
RCV VOL RI: 29 CELLS/UL — SIGNIFICANT CHANGE UP
SPECIMEN SOURCE FLD: SIGNIFICANT CHANGE UP
TOTAL NUCLEATED CELL COUNT, BODY FLUID: 34 CELLS/UL — SIGNIFICANT CHANGE UP

## 2025-03-26 PROCEDURE — 87594 PNEUMCYSTS JIROVECII AMP PRB: CPT

## 2025-03-26 PROCEDURE — 88112 CYTOPATH CELL ENHANCE TECH: CPT

## 2025-03-26 PROCEDURE — 88312 SPECIAL STAINS GROUP 1: CPT | Mod: 26

## 2025-03-26 PROCEDURE — 88305 TISSUE EXAM BY PATHOLOGIST: CPT | Mod: 26

## 2025-03-26 PROCEDURE — S2900 ROBOTIC SURGICAL SYSTEM: CPT | Mod: NC

## 2025-03-26 PROCEDURE — 31625 BRONCHOSCOPY W/BIOPSY(S): CPT | Mod: 59

## 2025-03-26 PROCEDURE — 71045 X-RAY EXAM CHEST 1 VIEW: CPT

## 2025-03-26 PROCEDURE — 31627 NAVIGATIONAL BRONCHOSCOPY: CPT

## 2025-03-26 PROCEDURE — 31625 BRONCHOSCOPY W/BIOPSY(S): CPT | Mod: XU

## 2025-03-26 PROCEDURE — 87102 FUNGUS ISOLATION CULTURE: CPT

## 2025-03-26 PROCEDURE — 31654 BRONCH EBUS IVNTJ PERPH LES: CPT

## 2025-03-26 PROCEDURE — 88104 CYTOPATH FL NONGYN SMEARS: CPT | Mod: 26,59

## 2025-03-26 PROCEDURE — 89051 BODY FLUID CELL COUNT: CPT

## 2025-03-26 PROCEDURE — 31624 DX BRONCHOSCOPE/LAVAGE: CPT

## 2025-03-26 PROCEDURE — 88104 CYTOPATH FL NONGYN SMEARS: CPT

## 2025-03-26 PROCEDURE — 76000 FLUOROSCOPY <1 HR PHYS/QHP: CPT

## 2025-03-26 PROCEDURE — 88312 SPECIAL STAINS GROUP 1: CPT

## 2025-03-26 PROCEDURE — 88112 CYTOPATH CELL ENHANCE TECH: CPT | Mod: 26,59

## 2025-03-26 PROCEDURE — 88173 CYTOPATH EVAL FNA REPORT: CPT | Mod: 26,59

## 2025-03-26 PROCEDURE — 87070 CULTURE OTHR SPECIMN AEROBIC: CPT

## 2025-03-26 PROCEDURE — 88173 CYTOPATH EVAL FNA REPORT: CPT

## 2025-03-26 PROCEDURE — 31628 BRONCHOSCOPY/LUNG BX EACH: CPT

## 2025-03-26 PROCEDURE — 31629 BRONCHOSCOPY/NEEDLE BX EACH: CPT

## 2025-03-26 PROCEDURE — 31623 DX BRONCHOSCOPE/BRUSH: CPT

## 2025-03-26 PROCEDURE — S2900: CPT

## 2025-03-26 PROCEDURE — 87077 CULTURE AEROBIC IDENTIFY: CPT

## 2025-03-26 PROCEDURE — 87116 MYCOBACTERIA CULTURE: CPT

## 2025-03-26 PROCEDURE — 87206 SMEAR FLUORESCENT/ACID STAI: CPT

## 2025-03-26 PROCEDURE — 87205 SMEAR GRAM STAIN: CPT

## 2025-03-26 PROCEDURE — 88305 TISSUE EXAM BY PATHOLOGIST: CPT

## 2025-03-26 PROCEDURE — 82962 GLUCOSE BLOOD TEST: CPT

## 2025-03-26 PROCEDURE — C9399: CPT

## 2025-03-26 PROCEDURE — 87015 SPECIMEN INFECT AGNT CONCNTJ: CPT

## 2025-03-26 PROCEDURE — 71045 X-RAY EXAM CHEST 1 VIEW: CPT | Mod: 26

## 2025-03-26 NOTE — ASU PATIENT PROFILE, ADULT - FALL HARM RISK - UNIVERSAL INTERVENTIONS
Bed in lowest position, wheels locked, appropriate side rails in place/Call bell, personal items and telephone in reach/Instruct patient to call for assistance before getting out of bed or chair/Non-slip footwear when patient is out of bed/[place] to call system/Physically safe environment - no spills, clutter or unnecessary equipment/Purposeful Proactive Rounding/Room/bathroom lighting operational, light cord in reach

## 2025-03-27 LAB
GRAM STN FLD: ABNORMAL
NIGHT BLUE STAIN TISS: SIGNIFICANT CHANGE UP
SPECIMEN SOURCE: SIGNIFICANT CHANGE UP
SPECIMEN SOURCE: SIGNIFICANT CHANGE UP

## 2025-03-28 LAB
CULTURE RESULTS: ABNORMAL
SPECIMEN SOURCE: SIGNIFICANT CHANGE UP

## 2025-03-29 LAB
P JIROVECII DNA L RESP QL NAA+NON-PROBE: NEGATIVE — SIGNIFICANT CHANGE UP
SPECIMEN SOURCE: SIGNIFICANT CHANGE UP

## 2025-04-02 LAB — NON-GYNECOLOGICAL CYTOLOGY STUDY: SIGNIFICANT CHANGE UP

## 2025-04-07 ENCOUNTER — APPOINTMENT (OUTPATIENT)
Dept: THORACIC SURGERY | Facility: CLINIC | Age: 70
End: 2025-04-07
Payer: MEDICARE

## 2025-04-07 VITALS
TEMPERATURE: 97.7 F | SYSTOLIC BLOOD PRESSURE: 150 MMHG | RESPIRATION RATE: 16 BRPM | WEIGHT: 112 LBS | DIASTOLIC BLOOD PRESSURE: 93 MMHG | HEIGHT: 61 IN | HEART RATE: 98 BPM | BODY MASS INDEX: 21.14 KG/M2 | OXYGEN SATURATION: 94 %

## 2025-04-07 PROCEDURE — 99214 OFFICE O/P EST MOD 30 MIN: CPT

## 2025-04-14 ENCOUNTER — APPOINTMENT (OUTPATIENT)
Dept: PULMONOLOGY | Facility: CLINIC | Age: 70
End: 2025-04-14
Payer: MEDICARE

## 2025-04-14 VITALS
SYSTOLIC BLOOD PRESSURE: 104 MMHG | WEIGHT: 112 LBS | HEIGHT: 61.5 IN | OXYGEN SATURATION: 92 % | BODY MASS INDEX: 20.87 KG/M2 | RESPIRATION RATE: 16 BRPM | HEART RATE: 96 BPM | DIASTOLIC BLOOD PRESSURE: 62 MMHG

## 2025-04-14 DIAGNOSIS — R93.89 ABNORMAL FINDINGS ON DIAGNOSTIC IMAGING OF OTHER SPECIFIED BODY STRUCTURES: ICD-10-CM

## 2025-04-14 DIAGNOSIS — F17.200 NICOTINE DEPENDENCE, UNSPECIFIED, UNCOMPLICATED: ICD-10-CM

## 2025-04-14 DIAGNOSIS — R05.9 COUGH, UNSPECIFIED: ICD-10-CM

## 2025-04-14 DIAGNOSIS — G47.33 OBSTRUCTIVE SLEEP APNEA (ADULT) (PEDIATRIC): ICD-10-CM

## 2025-04-14 DIAGNOSIS — J96.11 CHRONIC RESPIRATORY FAILURE WITH HYPOXIA: ICD-10-CM

## 2025-04-14 DIAGNOSIS — R09.82 POSTNASAL DRIP: ICD-10-CM

## 2025-04-14 DIAGNOSIS — F17.210 NICOTINE DEPENDENCE, CIGARETTES, UNCOMPLICATED: ICD-10-CM

## 2025-04-14 DIAGNOSIS — E66.3 OVERWEIGHT: ICD-10-CM

## 2025-04-14 DIAGNOSIS — R91.8 OTHER NONSPECIFIC ABNORMAL FINDING OF LUNG FIELD: ICD-10-CM

## 2025-04-14 DIAGNOSIS — R06.02 SHORTNESS OF BREATH: ICD-10-CM

## 2025-04-14 DIAGNOSIS — J43.2 CENTRILOBULAR EMPHYSEMA: ICD-10-CM

## 2025-04-14 DIAGNOSIS — J44.9 CHRONIC OBSTRUCTIVE PULMONARY DISEASE, UNSPECIFIED: ICD-10-CM

## 2025-04-14 PROCEDURE — 99215 OFFICE O/P EST HI 40 MIN: CPT

## 2025-04-14 PROCEDURE — G2211 COMPLEX E/M VISIT ADD ON: CPT

## 2025-04-14 RX ORDER — PREDNISONE 10 MG/1
10 TABLET ORAL DAILY
Qty: 100 | Refills: 0 | Status: ACTIVE | COMMUNITY
Start: 2025-04-14 | End: 1900-01-01

## 2025-04-14 RX ORDER — AZITHROMYCIN 250 MG/1
250 TABLET, FILM COATED ORAL
Qty: 1 | Refills: 0 | Status: ACTIVE | COMMUNITY
Start: 2025-04-14 | End: 1900-01-01

## 2025-05-12 ENCOUNTER — APPOINTMENT (OUTPATIENT)
Dept: CT IMAGING | Facility: CLINIC | Age: 70
End: 2025-05-12

## 2025-05-12 ENCOUNTER — OUTPATIENT (OUTPATIENT)
Dept: OUTPATIENT SERVICES | Facility: HOSPITAL | Age: 70
LOS: 1 days | End: 2025-05-12
Payer: MEDICARE

## 2025-05-12 DIAGNOSIS — Z98.890 OTHER SPECIFIED POSTPROCEDURAL STATES: Chronic | ICD-10-CM

## 2025-05-12 DIAGNOSIS — R93.89 ABNORMAL FINDINGS ON DIAGNOSTIC IMAGING OF OTHER SPECIFIED BODY STRUCTURES: ICD-10-CM

## 2025-05-12 PROCEDURE — 71250 CT THORAX DX C-: CPT | Mod: 26

## 2025-05-12 PROCEDURE — 71250 CT THORAX DX C-: CPT

## 2025-06-20 ENCOUNTER — APPOINTMENT (OUTPATIENT)
Dept: PULMONOLOGY | Facility: CLINIC | Age: 70
End: 2025-06-20
Payer: MEDICARE

## 2025-06-20 VITALS
SYSTOLIC BLOOD PRESSURE: 106 MMHG | OXYGEN SATURATION: 96 % | RESPIRATION RATE: 16 BRPM | DIASTOLIC BLOOD PRESSURE: 62 MMHG | HEART RATE: 98 BPM

## 2025-06-20 VITALS — WEIGHT: 111 LBS | BODY MASS INDEX: 20.69 KG/M2 | HEIGHT: 61.5 IN

## 2025-06-20 PROCEDURE — G2211 COMPLEX E/M VISIT ADD ON: CPT

## 2025-06-20 PROCEDURE — 99215 OFFICE O/P EST HI 40 MIN: CPT

## 2025-06-27 ENCOUNTER — NON-APPOINTMENT (OUTPATIENT)
Age: 70
End: 2025-06-27

## 2025-07-08 ENCOUNTER — LABORATORY RESULT (OUTPATIENT)
Age: 70
End: 2025-07-08

## 2025-07-21 ENCOUNTER — APPOINTMENT (OUTPATIENT)
Facility: CLINIC | Age: 70
End: 2025-07-21
Payer: MEDICARE

## 2025-07-21 ENCOUNTER — NON-APPOINTMENT (OUTPATIENT)
Age: 70
End: 2025-07-21

## 2025-07-21 VITALS
HEIGHT: 61.5 IN | WEIGHT: 109 LBS | BODY MASS INDEX: 20.32 KG/M2 | HEART RATE: 103 BPM | OXYGEN SATURATION: 91 % | DIASTOLIC BLOOD PRESSURE: 68 MMHG | SYSTOLIC BLOOD PRESSURE: 130 MMHG

## 2025-07-21 DIAGNOSIS — J44.9 CHRONIC OBSTRUCTIVE PULMONARY DISEASE, UNSPECIFIED: ICD-10-CM

## 2025-07-21 DIAGNOSIS — R91.8 OTHER NONSPECIFIC ABNORMAL FINDING OF LUNG FIELD: ICD-10-CM

## 2025-07-21 DIAGNOSIS — F17.210 NICOTINE DEPENDENCE, CIGARETTES, UNCOMPLICATED: ICD-10-CM

## 2025-07-21 PROCEDURE — 99214 OFFICE O/P EST MOD 30 MIN: CPT

## 2025-07-23 ENCOUNTER — RX ONLY (RX ONLY)
Age: 70
End: 2025-07-23

## 2025-07-23 ENCOUNTER — OFFICE (OUTPATIENT)
Dept: URBAN - METROPOLITAN AREA CLINIC 104 | Facility: CLINIC | Age: 70
Setting detail: OPHTHALMOLOGY
End: 2025-07-23
Payer: MEDICARE

## 2025-07-23 DIAGNOSIS — H01.002: ICD-10-CM

## 2025-07-23 DIAGNOSIS — H43.821: ICD-10-CM

## 2025-07-23 DIAGNOSIS — H01.005: ICD-10-CM

## 2025-07-23 DIAGNOSIS — H01.001: ICD-10-CM

## 2025-07-23 DIAGNOSIS — H35.373: ICD-10-CM

## 2025-07-23 DIAGNOSIS — H01.004: ICD-10-CM

## 2025-07-23 PROBLEM — H43.813 VITREOUS DETACHMENT; BOTH EYES: Status: ACTIVE | Noted: 2025-07-23

## 2025-07-23 PROBLEM — E11.3292 DM TYPE 2; RIGHT MOD WITH ME, LEFT MILD WITHOUT ME: Status: ACTIVE | Noted: 2025-07-23

## 2025-07-23 PROBLEM — H16.223 DRY EYE SYNDROME K SICCA; BOTH EYES: Status: ACTIVE | Noted: 2025-07-23

## 2025-07-23 PROBLEM — E11.3311 DM TYPE 2; RIGHT MOD WITH ME, LEFT MILD WITHOUT ME: Status: ACTIVE | Noted: 2025-07-23

## 2025-07-23 LAB
BASOPHILS # BLD AUTO: 0.05 K/UL
BASOPHILS NFR BLD AUTO: 0.6 %
EOSINOPHIL # BLD AUTO: 0.23 K/UL
EOSINOPHIL NFR BLD AUTO: 2.6 %
HCT VFR BLD CALC: 48.3 %
HGB BLD-MCNC: 15.4 G/DL
IMM GRANULOCYTES NFR BLD AUTO: 0.3 %
LYMPHOCYTES # BLD AUTO: 2.7 K/UL
LYMPHOCYTES NFR BLD AUTO: 31 %
MAN DIFF?: NORMAL
MCHC RBC-ENTMCNC: 30.4 PG
MCHC RBC-ENTMCNC: 31.9 G/DL
MCV RBC AUTO: 95.3 FL
MONOCYTES # BLD AUTO: 0.6 K/UL
MONOCYTES NFR BLD AUTO: 6.9 %
NEUTROPHILS # BLD AUTO: 5.11 K/UL
NEUTROPHILS NFR BLD AUTO: 58.6 %
PLATELET # BLD AUTO: 330 K/UL
PROCALCITONIN SERPL-MCNC: 0.03 NG/ML
RBC # BLD: 5.07 M/UL
RBC # FLD: 14.2 %
WBC # FLD AUTO: 8.72 K/UL

## 2025-07-23 PROCEDURE — 92014 COMPRE OPH EXAM EST PT 1/>: CPT | Performed by: OPTOMETRIST

## 2025-07-23 PROCEDURE — 92134 CPTRZ OPH DX IMG PST SGM RTA: CPT | Performed by: OPTOMETRIST

## 2025-07-23 ASSESSMENT — VISUAL ACUITY
OS_BCVA: 20/25+1
OD_BCVA: 20/25+1

## 2025-07-23 ASSESSMENT — TONOMETRY
OD_IOP_MMHG: 19
OS_IOP_MMHG: 19

## 2025-07-23 ASSESSMENT — KERATOMETRY
OS_AXISANGLE_DEGREES: 178
OS_K1POWER_DIOPTERS: 45.61
METHOD_AUTO_MANUAL: AUTO
OD_AXISANGLE_DEGREES: 086
OD_K1POWER_DIOPTERS: 45.18
OD_K2POWER_DIOPTERS: 45.79
OS_K2POWER_DIOPTERS: 45.86

## 2025-07-23 ASSESSMENT — SUPERFICIAL PUNCTATE KERATITIS (SPK)
OS_SPK: T
OD_SPK: T

## 2025-07-23 ASSESSMENT — REFRACTION_AUTOREFRACTION
OS_SPHERE: +0.50
OS_AXIS: 091
OD_AXIS: 176
OD_SPHERE: -0.75
OS_CYLINDER: -1.00
OD_CYLINDER: -0.75

## 2025-07-23 ASSESSMENT — LID EXAM ASSESSMENTS
OS_BLEPHARITIS: LLL LUL 1+
OD_BLEPHARITIS: RLL RUL 1+

## 2025-07-23 ASSESSMENT — CONFRONTATIONAL VISUAL FIELD TEST (CVF)
OS_FINDINGS: FULL
OD_FINDINGS: FULL

## 2025-07-23 ASSESSMENT — LID POSITION - ECTROPION
OS_ECTROPION: LLL T
OD_ECTROPION: RLL T

## 2025-07-29 RX ORDER — PREDNISONE 5 MG/1
5 TABLET ORAL
Qty: 21 | Refills: 0 | Status: ACTIVE | COMMUNITY
Start: 2025-07-29 | End: 1900-01-01

## 2025-08-19 ENCOUNTER — APPOINTMENT (OUTPATIENT)
Dept: INFECTIOUS DISEASE | Facility: CLINIC | Age: 70
End: 2025-08-19
Payer: MEDICARE

## 2025-08-19 VITALS
OXYGEN SATURATION: 89 % | HEART RATE: 107 BPM | WEIGHT: 110.44 LBS | DIASTOLIC BLOOD PRESSURE: 68 MMHG | HEIGHT: 61.5 IN | TEMPERATURE: 207.14 F | BODY MASS INDEX: 20.58 KG/M2 | SYSTOLIC BLOOD PRESSURE: 118 MMHG

## 2025-08-19 DIAGNOSIS — R91.8 OTHER NONSPECIFIC ABNORMAL FINDING OF LUNG FIELD: ICD-10-CM

## 2025-08-19 DIAGNOSIS — J44.9 CHRONIC OBSTRUCTIVE PULMONARY DISEASE, UNSPECIFIED: ICD-10-CM

## 2025-08-19 DIAGNOSIS — R05.9 COUGH, UNSPECIFIED: ICD-10-CM

## 2025-08-19 DIAGNOSIS — R93.89 ABNORMAL FINDINGS ON DIAGNOSTIC IMAGING OF OTHER SPECIFIED BODY STRUCTURES: ICD-10-CM

## 2025-08-19 DIAGNOSIS — Z22.39 CARRIER OF OTHER SPECIFIED BACTERIAL DISEASES: ICD-10-CM

## 2025-08-19 DIAGNOSIS — F17.200 NICOTINE DEPENDENCE, UNSPECIFIED, UNCOMPLICATED: ICD-10-CM

## 2025-08-19 PROCEDURE — 99205 OFFICE O/P NEW HI 60 MIN: CPT

## 2025-09-02 ENCOUNTER — APPOINTMENT (OUTPATIENT)
Dept: PULMONOLOGY | Facility: CLINIC | Age: 70
End: 2025-09-02
Payer: MEDICARE

## 2025-09-02 DIAGNOSIS — Z22.39 CARRIER OF OTHER SPECIFIED BACTERIAL DISEASES: ICD-10-CM

## 2025-09-02 DIAGNOSIS — R91.8 OTHER NONSPECIFIC ABNORMAL FINDING OF LUNG FIELD: ICD-10-CM

## 2025-09-02 DIAGNOSIS — J44.9 CHRONIC OBSTRUCTIVE PULMONARY DISEASE, UNSPECIFIED: ICD-10-CM

## 2025-09-02 DIAGNOSIS — J43.2 CENTRILOBULAR EMPHYSEMA: ICD-10-CM

## 2025-09-02 DIAGNOSIS — R93.89 ABNORMAL FINDINGS ON DIAGNOSTIC IMAGING OF OTHER SPECIFIED BODY STRUCTURES: ICD-10-CM

## 2025-09-02 DIAGNOSIS — G47.33 OBSTRUCTIVE SLEEP APNEA (ADULT) (PEDIATRIC): ICD-10-CM

## 2025-09-02 DIAGNOSIS — F17.200 NICOTINE DEPENDENCE, UNSPECIFIED, UNCOMPLICATED: ICD-10-CM

## 2025-09-02 DIAGNOSIS — R06.02 SHORTNESS OF BREATH: ICD-10-CM

## 2025-09-02 DIAGNOSIS — J96.11 CHRONIC RESPIRATORY FAILURE WITH HYPOXIA: ICD-10-CM

## 2025-09-02 DIAGNOSIS — R09.82 POSTNASAL DRIP: ICD-10-CM

## 2025-09-02 DIAGNOSIS — E66.3 OVERWEIGHT: ICD-10-CM

## 2025-09-02 DIAGNOSIS — F17.210 NICOTINE DEPENDENCE, CIGARETTES, UNCOMPLICATED: ICD-10-CM

## 2025-09-02 DIAGNOSIS — R05.9 COUGH, UNSPECIFIED: ICD-10-CM

## 2025-09-02 PROCEDURE — 99214 OFFICE O/P EST MOD 30 MIN: CPT | Mod: 2W,25

## 2025-09-02 PROCEDURE — 99406 BEHAV CHNG SMOKING 3-10 MIN: CPT | Mod: 2W

## 2025-09-02 PROCEDURE — G2211 COMPLEX E/M VISIT ADD ON: CPT | Mod: 2W

## (undated) DEVICE — PACK IV START WITH CHG

## (undated) DEVICE — WARMING BLANKET LOWER ADULT

## (undated) DEVICE — ION BIOPSY NEEDLE 21G

## (undated) DEVICE — ION SENSOR CONNECTION CLEANER

## (undated) DEVICE — ION VISION PROBE BAG

## (undated) DEVICE — SOL IRR POUR NS 0.9% 1000ML

## (undated) DEVICE — DENTURE CUP PINK

## (undated) DEVICE — SYR LUER LOK 10CC

## (undated) DEVICE — FORCEP BIOPSY OVAL CUP DISP

## (undated) DEVICE — ION FULLY ARTICULATING CATHETER

## (undated) DEVICE — GOWN IMPERV XL

## (undated) DEVICE — DRAPE MAYO STAND 23"

## (undated) DEVICE — VISITEC 4X4

## (undated) DEVICE — VENODYNE/SCD SLEEVE CALF MEDIUM

## (undated) DEVICE — DRAPE 3/4 SHEET 52X76"

## (undated) DEVICE — ION CATHETER GUIDE

## (undated) DEVICE — TUBING CONNECTING 6MM 20FT

## (undated) DEVICE — ION VISION PROBE ADAPTOR

## (undated) DEVICE — ION BIOPSY NEEDLE 19G

## (undated) DEVICE — SYR CONTROL LUER LOK 10CC

## (undated) DEVICE — DRAPE XL SHEET 77X98"

## (undated) DEVICE — VALVE SUCTION EVIS 160/200/240

## (undated) DEVICE — GLV 8 PROTEXIS (WHITE)